# Patient Record
Sex: MALE | Race: WHITE | Employment: OTHER | URBAN - METROPOLITAN AREA
[De-identification: names, ages, dates, MRNs, and addresses within clinical notes are randomized per-mention and may not be internally consistent; named-entity substitution may affect disease eponyms.]

---

## 2018-12-07 ENCOUNTER — APPOINTMENT (OUTPATIENT)
Dept: GENERAL RADIOLOGY | Age: 83
End: 2018-12-07
Payer: MEDICARE

## 2018-12-07 ENCOUNTER — HOSPITAL ENCOUNTER (EMERGENCY)
Age: 83
Discharge: HOME | End: 2018-12-07
Attending: EMERGENCY MEDICINE
Payer: MEDICARE

## 2018-12-07 ENCOUNTER — APPOINTMENT (OUTPATIENT)
Dept: CT IMAGING | Age: 83
End: 2018-12-07
Payer: MEDICARE

## 2018-12-07 VITALS
SYSTOLIC BLOOD PRESSURE: 180 MMHG | WEIGHT: 146.83 LBS | RESPIRATION RATE: 18 BRPM | HEIGHT: 67 IN | BODY MASS INDEX: 23.04 KG/M2 | OXYGEN SATURATION: 95 % | TEMPERATURE: 98.5 F | DIASTOLIC BLOOD PRESSURE: 74 MMHG | HEART RATE: 60 BPM

## 2018-12-07 DIAGNOSIS — R41.82 ALTERED MENTAL STATUS, UNSPECIFIED ALTERED MENTAL STATUS TYPE: Primary | ICD-10-CM

## 2018-12-07 LAB
A/G RATIO: 1.6 (ref 1.1–2.2)
ALBUMIN SERPL-MCNC: 4.6 G/DL (ref 3.4–5)
ALP BLD-CCNC: 106 U/L (ref 40–129)
ALT SERPL-CCNC: 14 U/L (ref 10–40)
ANION GAP SERPL CALCULATED.3IONS-SCNC: 16 MMOL/L (ref 3–16)
ANISOCYTOSIS: ABNORMAL
AST SERPL-CCNC: 21 U/L (ref 15–37)
BASOPHILS ABSOLUTE: 0.1 K/UL (ref 0–0.2)
BASOPHILS RELATIVE PERCENT: 0.8 %
BILIRUB SERPL-MCNC: 1 MG/DL (ref 0–1)
BILIRUBIN URINE: NEGATIVE
BLOOD, URINE: NEGATIVE
BUN BLDV-MCNC: 14 MG/DL (ref 7–20)
CALCIUM SERPL-MCNC: 9.8 MG/DL (ref 8.3–10.6)
CHLORIDE BLD-SCNC: 103 MMOL/L (ref 99–110)
CLARITY: CLEAR
CO2: 22 MMOL/L (ref 21–32)
COLOR: YELLOW
CREAT SERPL-MCNC: 0.8 MG/DL (ref 0.8–1.3)
EOSINOPHILS ABSOLUTE: 0.3 K/UL (ref 0–0.6)
EOSINOPHILS RELATIVE PERCENT: 4.5 %
GFR AFRICAN AMERICAN: >60
GFR NON-AFRICAN AMERICAN: >60
GLOBULIN: 2.8 G/DL
GLUCOSE BLD-MCNC: 115 MG/DL (ref 70–99)
GLUCOSE BLD-MCNC: 122 MG/DL (ref 70–99)
GLUCOSE URINE: NEGATIVE MG/DL
HCT VFR BLD CALC: 43.5 % (ref 40.5–52.5)
HEMOGLOBIN: 14.6 G/DL (ref 13.5–17.5)
KETONES, URINE: NEGATIVE MG/DL
LEUKOCYTE ESTERASE, URINE: NEGATIVE
LYMPHOCYTES ABSOLUTE: 1 K/UL (ref 1–5.1)
LYMPHOCYTES RELATIVE PERCENT: 14.7 %
MCH RBC QN AUTO: 31.1 PG (ref 26–34)
MCHC RBC AUTO-ENTMCNC: 33.6 G/DL (ref 31–36)
MCV RBC AUTO: 92.5 FL (ref 80–100)
MICROSCOPIC EXAMINATION: NORMAL
MONOCYTES ABSOLUTE: 0.6 K/UL (ref 0–1.3)
MONOCYTES RELATIVE PERCENT: 9.5 %
NEUTROPHILS ABSOLUTE: 4.7 K/UL (ref 1.7–7.7)
NEUTROPHILS RELATIVE PERCENT: 70.5 %
NITRITE, URINE: NEGATIVE
PDW BLD-RTO: 15.7 % (ref 12.4–15.4)
PERFORMED ON: ABNORMAL
PH UA: 5
PLATELET # BLD: 96 K/UL (ref 135–450)
PLATELET SLIDE REVIEW: ABNORMAL
PMV BLD AUTO: 8.7 FL (ref 5–10.5)
POTASSIUM SERPL-SCNC: 4 MMOL/L (ref 3.5–5.1)
PROTEIN UA: NEGATIVE MG/DL
RBC # BLD: 4.7 M/UL (ref 4.2–5.9)
SLIDE REVIEW: ABNORMAL
SODIUM BLD-SCNC: 141 MMOL/L (ref 136–145)
SPECIFIC GRAVITY UA: 1.02
TOTAL PROTEIN: 7.4 G/DL (ref 6.4–8.2)
URINE REFLEX TO CULTURE: NORMAL
URINE TYPE: NORMAL
UROBILINOGEN, URINE: 0.2 E.U./DL
WBC # BLD: 6.7 K/UL (ref 4–11)

## 2018-12-07 PROCEDURE — 93005 ELECTROCARDIOGRAM TRACING: CPT | Performed by: EMERGENCY MEDICINE

## 2018-12-07 PROCEDURE — 97161 PT EVAL LOW COMPLEX 20 MIN: CPT

## 2018-12-07 PROCEDURE — 81003 URINALYSIS AUTO W/O SCOPE: CPT

## 2018-12-07 PROCEDURE — 85025 COMPLETE CBC W/AUTO DIFF WBC: CPT

## 2018-12-07 PROCEDURE — G8988 SELF CARE GOAL STATUS: HCPCS

## 2018-12-07 PROCEDURE — 70450 CT HEAD/BRAIN W/O DYE: CPT

## 2018-12-07 PROCEDURE — 97166 OT EVAL MOD COMPLEX 45 MIN: CPT

## 2018-12-07 PROCEDURE — 99285 EMERGENCY DEPT VISIT HI MDM: CPT

## 2018-12-07 PROCEDURE — 71046 X-RAY EXAM CHEST 2 VIEWS: CPT

## 2018-12-07 PROCEDURE — G8987 SELF CARE CURRENT STATUS: HCPCS

## 2018-12-07 PROCEDURE — 97530 THERAPEUTIC ACTIVITIES: CPT

## 2018-12-07 PROCEDURE — G8979 MOBILITY GOAL STATUS: HCPCS

## 2018-12-07 PROCEDURE — G8978 MOBILITY CURRENT STATUS: HCPCS

## 2018-12-07 PROCEDURE — 80053 COMPREHEN METABOLIC PANEL: CPT

## 2018-12-07 RX ORDER — SIMVASTATIN 20 MG
20 TABLET ORAL NIGHTLY
COMMUNITY
Start: 2018-10-02

## 2018-12-07 RX ORDER — GLIPIZIDE 5 MG/1
5 TABLET, FILM COATED, EXTENDED RELEASE ORAL 2 TIMES DAILY WITH MEALS
Refills: 1 | COMMUNITY
Start: 2018-11-26

## 2018-12-07 RX ORDER — ASPIRIN 81 MG/1
81 TABLET ORAL NIGHTLY
COMMUNITY

## 2018-12-07 RX ORDER — LISINOPRIL 5 MG/1
5 TABLET ORAL DAILY
COMMUNITY

## 2018-12-07 RX ORDER — OMEPRAZOLE 20 MG/1
20 CAPSULE, DELAYED RELEASE ORAL DAILY PRN
COMMUNITY
Start: 2016-12-29

## 2018-12-07 RX ORDER — METOPROLOL SUCCINATE 50 MG/1
50 TABLET, EXTENDED RELEASE ORAL NIGHTLY
Status: ON HOLD | COMMUNITY
Start: 2018-09-17 | End: 2019-01-30

## 2018-12-07 ASSESSMENT — ENCOUNTER SYMPTOMS
SHORTNESS OF BREATH: 0
COLOR CHANGE: 0
RHINORRHEA: 0
BACK PAIN: 0
NAUSEA: 0
WHEEZING: 0
VOMITING: 0
PHOTOPHOBIA: 0

## 2018-12-07 NOTE — PROGRESS NOTES
AMS and multiple falls. Prior to admission, pt lived at home in a ranch-style home. He was independent with self-care and Mod I for fxl mobility with 3WW. Pt is currently below this baseline, being most limited by decreased balance and decreased insights/cognition. This date, he required mod verbal cues for safety during mobility, transfers and ADL tasks (donning R LE prosthesis). He required Min A for fxl mobility with use of RW. Pt will benefit from continued therapy while in the hospital in order to maximize function. Recommend continued therapy at d/c. Prognosis: Good  Decision Making: Medium Complexity  History: pt is from home where he lives alone. he was independent with self-care and fxl mobility. now admitted with AMS and multiple falls  Exam: decreased balance, decreased insights, decreased safety awareness, decreased activity tolerance  Assistance / Modification: Min A bed mobility, Min A fxl transfers and mobility with RW, Mod A LE dressing (don prosthesis). PMH includes: R BKA, HTN, DM, PVD, CAD, dementia  Patient Education: Role of OT, POC, transfer training  Barriers to Learning: cognition/emotional  REQUIRES OT FOLLOW UP: Yes  Activity Tolerance  Activity Tolerance: Patient Tolerated treatment well  Safety Devices  Safety Devices in place: Yes  Type of devices: Call light within reach; Left in bed;Gait belt;Patient at risk for falls (pt on stretcher in ED)         Plan   Plan  Times per week: 3-5  Times per day: Daily  Current Treatment Recommendations: Strengthening, Endurance Training, Safety Education & Training, Self-Care / ADL, Equipment Evaluation, Education, & procurement, Patient/Caregiver Education & Training, Cognitive Reorientation    G-Code  OT G-codes  Functional Assessment Tool Used: [facility] ADL   Score: 16  Functional Limitation: Self care  Self Care Current Status (): At least 40 percent but less than 60 percent impaired, limited or restricted  Self Care Goal Status ():  At
Status (): At least 20 percent but less than 40 percent impaired, limited or restricted                 AM-PAC Score  AM-PAC Inpatient Mobility Raw Score : 15  AM-PAC Inpatient T-Scale Score : 39.45  Mobility Inpatient CMS 0-100% Score: 57.7  Mobility Inpatient CMS G-Code Modifier : CK          Goals  Short term goals  Time Frame for Short term goals:  In 2-3 days pt will perform  Short term goal 1: Bed Mobility CGA  Short term goal 2: Transfers CGA  Short term goal 3: Ambulate 48' with LRAD, Min A   Long term goals  Time Frame for Long term goals : LTG = STG  Patient Goals   Patient goals : Unable to state       Therapy Time   Individual Concurrent Group Co-treatment   Time In       1015   Time Out       1515   Minutes       33   Timed Code Treatment Minutes: 120 Karri Hartman, PT    Electronically signed by Camila Hartman, PT [id number] on 12/7/2018 at 3:59 PM

## 2018-12-07 NOTE — ED PROVIDER NOTES
Gastrointestinal: Negative for nausea and vomiting. Endocrine: Negative for polydipsia and polyuria. Genitourinary: Negative for difficulty urinating and frequency. Musculoskeletal: Negative for back pain and gait problem. Skin: Negative for color change and rash. Neurological: Negative for light-headedness and headaches. Psychiatric/Behavioral: Negative for confusion. The patient is not nervous/anxious. Except as noted above the remainder of the review of systems was reviewed and negative. PAST MEDICAL HISTORY     Past Medical History:   Diagnosis Date    [facility]ab[facility]) [facility] [address]     CAD (coronary artery disease)     Cerebral artery occlusion with cerebral infarction ([facility] [address]     DDD (degenerative disc disease), cervical     Dementia     Diabetes mellitus ([facility] [address]     Hyperlipidemia     Hypertension     Pacemaker     PVD (peripheral vascular disease) ([facility] [address]          SURGICALHISTORY       Past Surgical History:   Procedure Laterality Date    CARDIAC SURGERY  May 2007    CABG X 7    LEG AMPUTATION BELOW KNEE      right         CURRENT MEDICATIONS       Previous Medications    No medications on file       ALLERGIES     Clopidogrel; Donepezil; Naproxen; and Metformin    FAMILY HISTORY     History reviewed. No pertinent family history.        SOCIAL HISTORY       Social History     Social History    Marital status:      Spouse name: N/A    Number of children: N/A    Years of education: N/A     Social History Main Topics    Smoking status: None    Smokeless tobacco: None    Alcohol use None    Drug use: Unknown    Sexual activity: Not Asked     Other Topics Concern    None     Social History Narrative    None       SCREENINGS      @FLOW([id number])@      PHYSICAL EXAM    (up to 7 for level 4, 8 or more for level 5)     ED Triage Vitals   BP Temp Temp Source Pulse Resp SpO2 Height Weight   12/07/18 1134 12/07/18 1134 12/07/18 1134 12/07/18 1134 12/07/18 1134 12/07/18 1130 12/07/18 1134 12/07/18 1134   (!) 166/93 97.7 °F (36.5 °C) Oral 76 12 96 % 5' 7\" (1.702 m) 146 lb 13.2 oz (66.6 kg)       Physical Exam   Constitutional: He is oriented to person, place, and time. He appears well-developed and well-nourished. No distress. HENT:   Head: Normocephalic and atraumatic. Eyes: Conjunctivae and EOM are normal.   Neck: Normal range of motion. No tracheal deviation present. Cardiovascular: Normal rate and regular rhythm. Pulmonary/Chest: Effort normal and breath sounds normal. He has no wheezes. He has no rales. Abdominal: Soft. He exhibits no distension. There is no tenderness. There is no CVA tenderness. Musculoskeletal: Normal range of motion. He exhibits no edema or deformity. Neurological: He is alert and oriented to person, place, and time. No cranial nerve deficit or sensory deficit. He exhibits normal muscle tone. Coordination normal.   Skin: Skin is warm and dry.        DIAGNOSTIC RESULTS     EKG: All EKG's are interpreted by the Emergency Department Physician who either signs or Co-signsthis chart in the absence of a cardiologist.        RADIOLOGY:   Meron Shah such as CT, Ultrasound and MRI are read by the radiologist. Plain radiographic images are visualized and preliminarily interpreted by the emergency physician with the below findings:        Interpretation per the Radiologist below, if available at the time ofthis note:    XR CHEST STANDARD (2 VW)    (Results Pending)   CT Head WO Contrast    (Results Pending)         ED BEDSIDE ULTRASOUND:   Performed by ED Physician - none    LABS:  Labs Reviewed   POCT GLUCOSE - Abnormal; Notable for the following:        Result Value    POC Glucose 115 (*)     All other components within normal limits    Narrative:     Performed at:  [address] [address]   Phone [phone]   CBC WITH AUTO DIFFERENTIAL   COMPREHENSIVE METABOLIC

## 2018-12-07 NOTE — CARE COORDINATION
Update patient's friend Dustyfeliciano Josse that I have not heard back from patient's children. She provided another daughter's name and #. Sergio Opitz [phone]. SW called and left message - requesting a call back.

## 2018-12-08 LAB
EKG ATRIAL RATE: 67 BPM
EKG DIAGNOSIS: NORMAL
EKG P AXIS: 49 DEGREES
EKG P-R INTERVAL: 80 MS
EKG Q-T INTERVAL: 454 MS
EKG QRS DURATION: 154 MS
EKG QTC CALCULATION (BAZETT): 479 MS
EKG R AXIS: 98 DEGREES
EKG T AXIS: -80 DEGREES
EKG VENTRICULAR RATE: 67 BPM

## 2018-12-08 PROCEDURE — 93010 ELECTROCARDIOGRAM REPORT: CPT | Performed by: INTERNAL MEDICINE

## 2019-01-30 ENCOUNTER — APPOINTMENT (OUTPATIENT)
Dept: CT IMAGING | Age: 84
DRG: 558 | End: 2019-01-30
Payer: MEDICARE

## 2019-01-30 ENCOUNTER — APPOINTMENT (OUTPATIENT)
Dept: GENERAL RADIOLOGY | Age: 84
DRG: 558 | End: 2019-01-30
Payer: MEDICARE

## 2019-01-30 ENCOUNTER — HOSPITAL ENCOUNTER (INPATIENT)
Age: 84
LOS: 5 days | Discharge: NURSING, REHAB OR LONG-TERM CARE | DRG: 558 | End: 2019-02-04
Attending: EMERGENCY MEDICINE | Admitting: INTERNAL MEDICINE
Payer: MEDICARE

## 2019-01-30 PROBLEM — M62.82 RHABDOMYOLYSIS: Status: ACTIVE | Noted: 2019-01-30

## 2019-01-30 PROBLEM — E78.00 HIGH CHOLESTEROL: Status: ACTIVE | Noted: 2019-01-30

## 2019-01-30 PROBLEM — E11.9 DM2 (DIABETES MELLITUS, TYPE 2) (HCC): Status: ACTIVE | Noted: 2019-01-30

## 2019-01-30 PROBLEM — I10 HTN (HYPERTENSION): Status: ACTIVE | Noted: 2019-01-30

## 2019-01-30 LAB
A/G RATIO: 1.2 (ref 1.1–2.2)
ALBUMIN SERPL-MCNC: 4 G/DL (ref 3.4–5)
ALP BLD-CCNC: 123 U/L (ref 40–129)
ALT SERPL-CCNC: 26 U/L (ref 10–40)
AMPHETAMINE SCREEN, URINE: NORMAL
ANION GAP SERPL CALCULATED.3IONS-SCNC: 15 MMOL/L (ref 3–16)
AST SERPL-CCNC: 56 U/L (ref 15–37)
BARBITURATE SCREEN URINE: NORMAL
BASOPHILS ABSOLUTE: 0 K/UL (ref 0–0.2)
BASOPHILS RELATIVE PERCENT: 0.3 %
BENZODIAZEPINE SCREEN, URINE: NORMAL
BILIRUB SERPL-MCNC: 1.3 MG/DL (ref 0–1)
BILIRUBIN URINE: ABNORMAL
BLOOD, URINE: NEGATIVE
BUN BLDV-MCNC: 21 MG/DL (ref 7–20)
CALCIUM SERPL-MCNC: 9.6 MG/DL (ref 8.3–10.6)
CANNABINOID SCREEN URINE: NORMAL
CHLORIDE BLD-SCNC: 98 MMOL/L (ref 99–110)
CLARITY: ABNORMAL
CO2: 23 MMOL/L (ref 21–32)
COCAINE METABOLITE SCREEN URINE: NORMAL
COLOR: ABNORMAL
CREAT SERPL-MCNC: 0.8 MG/DL (ref 0.8–1.3)
EKG ATRIAL RATE: 75 BPM
EKG DIAGNOSIS: NORMAL
EKG P AXIS: 45 DEGREES
EKG P-R INTERVAL: 152 MS
EKG Q-T INTERVAL: 450 MS
EKG QRS DURATION: 130 MS
EKG QTC CALCULATION (BAZETT): 502 MS
EKG R AXIS: 120 DEGREES
EKG T AXIS: -20 DEGREES
EKG VENTRICULAR RATE: 75 BPM
EOSINOPHILS ABSOLUTE: 0 K/UL (ref 0–0.6)
EOSINOPHILS RELATIVE PERCENT: 0 %
EPITHELIAL CELLS, UA: 0 /HPF (ref 0–5)
ETHANOL: NORMAL MG/DL (ref 0–0.08)
GFR AFRICAN AMERICAN: >60
GFR NON-AFRICAN AMERICAN: >60
GLOBULIN: 3.4 G/DL
GLUCOSE BLD-MCNC: 111 MG/DL (ref 70–99)
GLUCOSE BLD-MCNC: 144 MG/DL (ref 70–99)
GLUCOSE URINE: NEGATIVE MG/DL
HCT VFR BLD CALC: 42.4 % (ref 40.5–52.5)
HEMOGLOBIN: 14.6 G/DL (ref 13.5–17.5)
HYALINE CASTS: 1 /LPF (ref 0–8)
INR BLD: 1.17 (ref 0.86–1.14)
KETONES, URINE: 40 MG/DL
LACTIC ACID: 1.5 MMOL/L (ref 0.4–2)
LEUKOCYTE ESTERASE, URINE: NEGATIVE
LYMPHOCYTES ABSOLUTE: 0.7 K/UL (ref 1–5.1)
LYMPHOCYTES RELATIVE PERCENT: 8 %
Lab: NORMAL
MCH RBC QN AUTO: 30.9 PG (ref 26–34)
MCHC RBC AUTO-ENTMCNC: 34.4 G/DL (ref 31–36)
MCV RBC AUTO: 89.8 FL (ref 80–100)
METHADONE SCREEN, URINE: NORMAL
MICROSCOPIC EXAMINATION: YES
MONOCYTES ABSOLUTE: 1 K/UL (ref 0–1.3)
MONOCYTES RELATIVE PERCENT: 10.6 %
NEUTROPHILS ABSOLUTE: 7.3 K/UL (ref 1.7–7.7)
NEUTROPHILS RELATIVE PERCENT: 81.1 %
NITRITE, URINE: NEGATIVE
OPIATE SCREEN URINE: NORMAL
OXYCODONE URINE: NORMAL
PDW BLD-RTO: 14.1 % (ref 12.4–15.4)
PERFORMED ON: ABNORMAL
PH UA: 5.5
PH UA: 5.5
PHENCYCLIDINE SCREEN URINE: NORMAL
PLATELET # BLD: 129 K/UL (ref 135–450)
PMV BLD AUTO: 8 FL (ref 5–10.5)
POTASSIUM REFLEX MAGNESIUM: 3.8 MMOL/L (ref 3.5–5.1)
PRO-BNP: 3360 PG/ML (ref 0–449)
PROPOXYPHENE SCREEN: NORMAL
PROTEIN UA: ABNORMAL MG/DL
PROTHROMBIN TIME: 13.3 SEC (ref 9.8–13)
RBC # BLD: 4.72 M/UL (ref 4.2–5.9)
RBC UA: 4 /HPF (ref 0–4)
SODIUM BLD-SCNC: 136 MMOL/L (ref 136–145)
SPECIFIC GRAVITY UA: 1.02
TOTAL CK: 983 U/L (ref 39–308)
TOTAL PROTEIN: 7.4 G/DL (ref 6.4–8.2)
TROPONIN: <0.01 NG/ML
URINE REFLEX TO CULTURE: ABNORMAL
URINE TYPE: ABNORMAL
UROBILINOGEN, URINE: 1 E.U./DL
WBC # BLD: 9 K/UL (ref 4–11)
WBC UA: 4 /HPF (ref 0–5)

## 2019-01-30 PROCEDURE — 85610 PROTHROMBIN TIME: CPT

## 2019-01-30 PROCEDURE — 84300 ASSAY OF URINE SODIUM: CPT

## 2019-01-30 PROCEDURE — 83880 ASSAY OF NATRIURETIC PEPTIDE: CPT

## 2019-01-30 PROCEDURE — 96366 THER/PROPH/DIAG IV INF ADDON: CPT

## 2019-01-30 PROCEDURE — 2580000003 HC RX 258: Performed by: INTERNAL MEDICINE

## 2019-01-30 PROCEDURE — 6370000000 HC RX 637 (ALT 250 FOR IP): Performed by: INTERNAL MEDICINE

## 2019-01-30 PROCEDURE — 85025 COMPLETE CBC W/AUTO DIFF WBC: CPT

## 2019-01-30 PROCEDURE — 84133 ASSAY OF URINE POTASSIUM: CPT

## 2019-01-30 PROCEDURE — 96361 HYDRATE IV INFUSION ADD-ON: CPT

## 2019-01-30 PROCEDURE — 93005 ELECTROCARDIOGRAM TRACING: CPT | Performed by: EMERGENCY MEDICINE

## 2019-01-30 PROCEDURE — 93010 ELECTROCARDIOGRAM REPORT: CPT | Performed by: INTERNAL MEDICINE

## 2019-01-30 PROCEDURE — 83605 ASSAY OF LACTIC ACID: CPT

## 2019-01-30 PROCEDURE — 2580000003 HC RX 258: Performed by: EMERGENCY MEDICINE

## 2019-01-30 PROCEDURE — 6360000002 HC RX W HCPCS: Performed by: INTERNAL MEDICINE

## 2019-01-30 PROCEDURE — 99285 EMERGENCY DEPT VISIT HI MDM: CPT

## 2019-01-30 PROCEDURE — G0480 DRUG TEST DEF 1-7 CLASSES: HCPCS

## 2019-01-30 PROCEDURE — 83935 ASSAY OF URINE OSMOLALITY: CPT

## 2019-01-30 PROCEDURE — 82570 ASSAY OF URINE CREATININE: CPT

## 2019-01-30 PROCEDURE — 80307 DRUG TEST PRSMV CHEM ANLYZR: CPT

## 2019-01-30 PROCEDURE — 96372 THER/PROPH/DIAG INJ SC/IM: CPT

## 2019-01-30 PROCEDURE — 81001 URINALYSIS AUTO W/SCOPE: CPT

## 2019-01-30 PROCEDURE — 1200000000 HC SEMI PRIVATE

## 2019-01-30 PROCEDURE — 80053 COMPREHEN METABOLIC PANEL: CPT

## 2019-01-30 PROCEDURE — 70450 CT HEAD/BRAIN W/O DYE: CPT

## 2019-01-30 PROCEDURE — 71045 X-RAY EXAM CHEST 1 VIEW: CPT

## 2019-01-30 PROCEDURE — 2500000003 HC RX 250 WO HCPCS: Performed by: INTERNAL MEDICINE

## 2019-01-30 PROCEDURE — 94760 N-INVAS EAR/PLS OXIMETRY 1: CPT

## 2019-01-30 PROCEDURE — 84484 ASSAY OF TROPONIN QUANT: CPT

## 2019-01-30 PROCEDURE — 82550 ASSAY OF CK (CPK): CPT

## 2019-01-30 PROCEDURE — 96365 THER/PROPH/DIAG IV INF INIT: CPT

## 2019-01-30 PROCEDURE — 87040 BLOOD CULTURE FOR BACTERIA: CPT

## 2019-01-30 PROCEDURE — 82436 ASSAY OF URINE CHLORIDE: CPT

## 2019-01-30 RX ORDER — SODIUM CHLORIDE 0.9 % (FLUSH) 0.9 %
10 SYRINGE (ML) INJECTION EVERY 12 HOURS SCHEDULED
Status: DISCONTINUED | OUTPATIENT
Start: 2019-01-30 | End: 2019-02-04 | Stop reason: HOSPADM

## 2019-01-30 RX ORDER — NICOTINE POLACRILEX 4 MG
15 LOZENGE BUCCAL PRN
Status: DISCONTINUED | OUTPATIENT
Start: 2019-01-30 | End: 2019-02-04 | Stop reason: HOSPADM

## 2019-01-30 RX ORDER — POTASSIUM CHLORIDE 7.45 MG/ML
10 INJECTION INTRAVENOUS PRN
Status: DISCONTINUED | OUTPATIENT
Start: 2019-01-30 | End: 2019-02-04 | Stop reason: HOSPADM

## 2019-01-30 RX ORDER — METOPROLOL SUCCINATE 50 MG/1
50 TABLET, EXTENDED RELEASE ORAL NIGHTLY
Status: DISCONTINUED | OUTPATIENT
Start: 2019-01-30 | End: 2019-02-04 | Stop reason: HOSPADM

## 2019-01-30 RX ORDER — OMEPRAZOLE 20 MG/1
20 CAPSULE, DELAYED RELEASE ORAL EVERY MORNING
Status: DISCONTINUED | OUTPATIENT
Start: 2019-01-31 | End: 2019-01-30 | Stop reason: CLARIF

## 2019-01-30 RX ORDER — DEXTROSE MONOHYDRATE 50 MG/ML
100 INJECTION, SOLUTION INTRAVENOUS PRN
Status: DISCONTINUED | OUTPATIENT
Start: 2019-01-30 | End: 2019-02-04 | Stop reason: HOSPADM

## 2019-01-30 RX ORDER — SIMVASTATIN 20 MG
20 TABLET ORAL NIGHTLY
Status: DISCONTINUED | OUTPATIENT
Start: 2019-01-30 | End: 2019-01-31

## 2019-01-30 RX ORDER — HEPARIN SODIUM 5000 [USP'U]/ML
5000 INJECTION, SOLUTION INTRAVENOUS; SUBCUTANEOUS EVERY 8 HOURS SCHEDULED
Status: DISCONTINUED | OUTPATIENT
Start: 2019-01-30 | End: 2019-02-04 | Stop reason: HOSPADM

## 2019-01-30 RX ORDER — SODIUM CHLORIDE 0.9 % (FLUSH) 0.9 %
10 SYRINGE (ML) INJECTION PRN
Status: DISCONTINUED | OUTPATIENT
Start: 2019-01-30 | End: 2019-02-04 | Stop reason: HOSPADM

## 2019-01-30 RX ORDER — 0.9 % SODIUM CHLORIDE 0.9 %
1000 INTRAVENOUS SOLUTION INTRAVENOUS ONCE
Status: COMPLETED | OUTPATIENT
Start: 2019-01-30 | End: 2019-01-30

## 2019-01-30 RX ORDER — ONDANSETRON 2 MG/ML
4 INJECTION INTRAMUSCULAR; INTRAVENOUS EVERY 6 HOURS PRN
Status: DISCONTINUED | OUTPATIENT
Start: 2019-01-30 | End: 2019-02-04 | Stop reason: HOSPADM

## 2019-01-30 RX ORDER — GLIPIZIDE 5 MG/1
5 TABLET, FILM COATED, EXTENDED RELEASE ORAL 2 TIMES DAILY WITH MEALS
Status: DISCONTINUED | OUTPATIENT
Start: 2019-01-30 | End: 2019-01-30 | Stop reason: CLARIF

## 2019-01-30 RX ORDER — LISINOPRIL 5 MG/1
5 TABLET ORAL DAILY
Status: DISCONTINUED | OUTPATIENT
Start: 2019-01-30 | End: 2019-02-04 | Stop reason: HOSPADM

## 2019-01-30 RX ORDER — DOCUSATE SODIUM 100 MG/1
100 CAPSULE, LIQUID FILLED ORAL 2 TIMES DAILY
Status: DISCONTINUED | OUTPATIENT
Start: 2019-01-30 | End: 2019-02-04 | Stop reason: HOSPADM

## 2019-01-30 RX ORDER — GLIPIZIDE 5 MG/1
5 TABLET ORAL
Status: DISCONTINUED | OUTPATIENT
Start: 2019-01-30 | End: 2019-01-31

## 2019-01-30 RX ORDER — ASPIRIN 81 MG/1
81 TABLET ORAL NIGHTLY
Status: DISCONTINUED | OUTPATIENT
Start: 2019-01-30 | End: 2019-02-04 | Stop reason: HOSPADM

## 2019-01-30 RX ORDER — DEXTROSE MONOHYDRATE 25 G/50ML
12.5 INJECTION, SOLUTION INTRAVENOUS PRN
Status: DISCONTINUED | OUTPATIENT
Start: 2019-01-30 | End: 2019-02-04 | Stop reason: HOSPADM

## 2019-01-30 RX ORDER — POTASSIUM CHLORIDE 20 MEQ/1
40 TABLET, EXTENDED RELEASE ORAL PRN
Status: DISCONTINUED | OUTPATIENT
Start: 2019-01-30 | End: 2019-02-04 | Stop reason: HOSPADM

## 2019-01-30 RX ORDER — PANTOPRAZOLE SODIUM 40 MG/1
40 TABLET, DELAYED RELEASE ORAL
Status: DISCONTINUED | OUTPATIENT
Start: 2019-01-31 | End: 2019-02-04 | Stop reason: HOSPADM

## 2019-01-30 RX ADMIN — SODIUM CHLORIDE 1000 ML: 9 INJECTION, SOLUTION INTRAVENOUS at 17:08

## 2019-01-30 RX ADMIN — HEPARIN SODIUM 5000 UNITS: 5000 INJECTION INTRAVENOUS; SUBCUTANEOUS at 20:42

## 2019-01-30 RX ADMIN — ASPIRIN 81 MG: 81 TABLET, COATED ORAL at 20:37

## 2019-01-30 RX ADMIN — Medication: at 20:38

## 2019-01-30 RX ADMIN — SIMVASTATIN 20 MG: 20 TABLET, FILM COATED ORAL at 20:37

## 2019-01-30 RX ADMIN — METOPROLOL SUCCINATE 50 MG: 50 TABLET, FILM COATED, EXTENDED RELEASE ORAL at 20:37

## 2019-01-30 RX ADMIN — Medication 10 ML: at 20:37

## 2019-01-30 ASSESSMENT — PAIN SCALES - GENERAL
PAINLEVEL_OUTOF10: 0
PAINLEVEL_OUTOF10: 0

## 2019-01-30 NOTE — CARE COORDINATION
Discharge Planning:  ORTEGA met with pt's son who reported pt lives alone, has early dementia and has fallen 4x in the last 2 weeks. Son reports pt should not be living alone and needs placement. Son stated pt was at [facility] last December, pt got better and become more alert and oriented and insisted on leaving. Pt was discharged home and son states it has been worse since. Pt was found today on the floor, unknown how long, with streaks of feces on the floor from the couch to the bathroom. Pt has a prosthetic leg and son reports when pt falls he is unable to pull himself back up by his arms. Son reports pt has [employer] for home meals and cleaning aide, also has 1 nurse coming to check on him once a week (unsure of agency). Son reported pt cannot manage his meds, is confused most of the time and unsafe to go home. Son stated he is [facility] and has copies of the forms. ORTEGA spoke with Dr. Shelva Leyden who stated he was aware of the situation and doesn't plan to send pt home. ORTEGA called [facility] and spoke with Ilen[facility], [phone], who stated yes they have beds right now. ORTEGA saw pt was admitted and spoke with son again informing pt will be admitted and [facility] has beds. Son stated [facility] was 2nd choice, but SW stated unsure if they take [facility]. ORTEGA stated I would fax facesheet to [facility] so they can follow pt and SW upstairs would assist with discharge to facility. ORTEGA faxed facesheet to [facility].       Electronically signed by KATHERIN Bryant, MYRA on 1/30/2019 at 5:36 PM

## 2019-01-30 NOTE — H&P
History and Physical  Dr. Milad Schaefer  1/30/2019    PCP: Gretel Jackson    Cc:   Chief Complaint   Patient presents with    Fall     Pt. comes in today by [facility] from home with a fall. Pt. was found in front of his recliner. EMS reports that there was a line of stool in front of his chair from where the patient had been crawling. Pt. has history of dementia. HPI:  Gabriella Shepherd is a 82 y.o. male who has a past medical history of AAA (abdominal aortic aneurysm) ([facility] [address] CAD (coronary artery disease); Cerebral artery occlusion with cerebral infarction [facility]); DDD (degenerative disc disease), cervical; Dementia; Diabetes mellitus ([facility] [address] Hyperlipidemia; Hypertension; Pacemaker; and PVD (peripheral vascular disease) ([facility] [address] Patient presents with Rhabdomyolysis. HPI   82 y.o. male  who presents to the ED complaining of Limited history from the patient directly given his dementia. He thinks it is 82, is unsure where he is or why, and says that he has no complaints although is able to identify himself. He has had no fevers. He denies any falls in over a month however for collateral history he probably has been having increased frequency of falls and was found in front of a lazy boy chair with a line of stool from the chair to the patient had been crawling previously. Patient does not recall this. Therefore history is unable to be further clarified from the patient himself regarding any kind of specific mechanism of fall, syncope or otherwise. Cannot get much other hx from patient. He gets somewhat belligerent on continued questioning    Problem list of hospitalization thus far:   Active Hospital Problems    Diagnosis    Rhabdomyolysis [M62.82]    HTN (hypertension) [I10]    High cholesterol [E78.00]    DM2 (diabetes mellitus, type 2) ([facility] [address] [E11.9]         Review of Systems: (1 system for EPF, 2-9 for detailed, 10+ for comprehensive)  Review of Systems   Unable to perform ROS: Dementia    (he just answers no to everything - I do not think this is a true ROS)       Past Medical History:   Past Medical History:   Diagnosis Date    AAA (abdominal aortic aneurysm) ([facility] [address]     CAD (coronary artery disease)     Cerebral artery occlusion with cerebral infarction ([facility] [address]     DDD (degenerative disc disease), cervical     Dementia     Diabetes mellitus ([facility] [address]     Hyperlipidemia     Hypertension     Pacemaker     PVD (peripheral vascular disease) ([facility] [address]        Past Surgical History:   Past Surgical History:   Procedure Laterality Date    CARDIAC SURGERY  May 2007    CABG X 7    LEG AMPUTATION BELOW KNEE      right       Social History:   Social History     Tobacco History     Smoking Status  Former Smoker Smoking Tobacco Type  Cigarettes    Smokeless Tobacco Use  Never Used          Alcohol History     Alcohol Use Status  No          Drug Use     Drug Use Status  No          Sexual Activity     Sexually Active  Not Asked                Fam History: History reviewed. No pertinent family history. PFSH: The above PMHx, PSHx, SocHx, FamHx has been reviewed by myself. (1 area for detailed, 2-3 for comprehensive)      Code Status: No Order    Meds - following list of home medications fromelectronic chart has been reviewed by myself  Prior to Admission medications    Medication Sig Start Date End Date Taking?  Authorizing Provider   aspirin 81 MG EC tablet Take 81 mg by mouth nightly     Historical Provider, MD   lisinopril (PRINIVIL;ZESTRIL) 5 MG tablet Take 5 mg by mouth daily     Historical Provider, MD   simvastatin (ZOCOR) 20 MG tablet Take 20 mg by mouth nightly 10/2/18   Historical Provider, MD   omeprazole (PRILOSEC) 20 MG delayed release capsule Take 20 mg by mouth daily as needed for For acid hypersecretion 12/29/16   Historical Provider, MD   glipiZIDE (GLUCOTROL XL) 5 MG extended release tablet Take 5 mg by mouth 2 times daily (with meals) 11/26/18   Historical Provider, MD           Allergies   Allergen Reactions    Clopidogrel Other (See Comments)    Donepezil Other (See Comments)    Naproxen Itching    Metformin Diarrhea             EXAM: (2-7 system for EPF/Detailed, ?8 for Comprehensive)  /65   Pulse 79   Temp 97.5 °F (36.4 °C) (Oral)   Resp 18   Ht 5' 7\" (1.702 m)   Wt 150 lb (68 kg)   SpO2 95%   BMI 23.49 kg/m²   Constitutional: vitals as above: alert, appears stated age and combative  Psychiatric: , oriented to person and year  Head: Normocephalic, without obvious abnormality, atraumatic  Eyes:lids and lashes normal, conjunctivae and sclerae normal and pupils equal, round, reactive to light and accomodation  EMNT: external ears normal, lips normal  Neck: no adenopathy, supple, symmetrical, trachea midline and thyroid not enlarged, symmetric, no tenderness/mass/nodules   Respiratory: clear to auscultation without wheezes or rales  Cardiovascular: normal rate, regular rhythm, normal S1 and S2 and no gallops  Gastrointestinal: soft, non-tender, non-distended, normal bowel sounds, no masses or organomegaly  Lymphatic:   Extremities: no edema. No clubbing  Skin:No rashes or nodules noted.   Neurologic:    LABS:  Labs Reviewed   CBC WITH AUTO DIFFERENTIAL - Abnormal; Notable for the following:        Result Value    Platelets 269 (*)     Lymphocytes # 0.7 (*)     All other components within normal limits    Narrative:     Performed at:  [facility]  [address] [facility] [employer]   Phone [phone]   COMPREHENSIVE METABOLIC PANEL W/ REFLEX TO MG FOR LOW K - Abnormal; Notable for the following:     Chloride 98 (*)     Glucose 144 (*)     BUN 21 (*)     Total Bilirubin 1.3 (*)     AST 56 (*)     All other components within normal limits    Narrative:     Performed at:  [facility]  [address] [facility], [address] [employer]   Phone [phone]   PROTIME-INR - Abnormal; Notable for the following:     Protime 13.3 (*)     INR 1.17 (*)     All other components within normal limits    Narrative:     Performed at:  [facility] [address],  [facility], [facility] [place]   Phone [phone]   URINE RT REFLEX TO CULTURE - Abnormal; Notable for the following:     Clarity, UA CLOUDY (*)     Bilirubin Urine SMALL (*)     Ketones, Urine 40 (*)     Protein, UA TRACE (*)     All other components within normal limits    Narrative:     Performed at:  [facility] [address],  [facility], [facility] [place]   Phone 21  - Abnormal; Notable for the following:     Pro-BNP 3,360 (*)     All other components within normal limits    Narrative:     Performed at:  [facility] [address],  [facility], [facility] [place]   Phone [phone]   CK - Abnormal; Notable for the following:      Total  (*)     All other components within normal limits    Narrative:     Performed at:  [facility] [address],  [facility], [facility] [place]   Phone [phone]   CULTURE BLOOD #1   CULTURE BLOOD #2   LACTIC ACID, PLASMA    Narrative:     Performed at:  [facility] [address],  [facility], [facility] [place]   Phone [phone]   TROPONIN    Narrative:     Performed at:  [facility] [address],  [facility], [facility] [place]   Phone [phone]   URINE DRUG SCREEN    Narrative:     Performed at:  [facility] [address],  [facility], [facility] [place]   Phone [phone]   ETHANOL    Narrative:     Performed at:  [facility] [address], [facility], [facility] [place]   Phone [phone]   MICROSCOPIC URINALYSIS    Narrative:     Performed at:  [facility] [address],  [facility], [facility] [place]   Phone [phone]         IMAGING:  Imaging results from the ER have of normal.  Left-sided cardiac device. Lungs appear clear. No acute bony abnormality. No acute findings. EKG: from ER, interpreted by self. SR at 75. No acute ST elevation noted. No TWI. LBBB present. Comparison is made to old EKG in chart dated 12/7/2018 which is morphologically similar. MEDICAL DECISION MAKING:    Patient Active Hospital Problem List:   Rhabdomyolysis (1/30/2019)    Assessment: New Problem to me. Pt with elevated CPK. Pt with hx of fall, unknwn how long he was on ground    Plan: admit, iv bicarb drip started. Serial  CPK to be followed. Will trend Creat to look for renal dysfxn   HTN (hypertension) (1/30/2019)    Assessment: Established problem. Stable. 138/65    Plan: Pt home BP meds reviewed and will be continued. Will monitor labs to assess Creat/K for possible complications of medications. High cholesterol (1/30/2019)    Assessment: Established problem. Stable. Plan: on statin, to continue   DM2 (diabetes mellitus, type 2) ([facility] [address] (1/30/2019)    Assessment: Established problem. Stable. FSBS 111 in ER    Plan: Patient placed on controlled carbohydrate diet. Fingerstick sugars to be checked to monitor for both hypoglycemia as well as hyperglycemia. Sliding scale insulin ordered. Glucagon and dextrose ordered for hypoglycemia. Patient will be continued on home medications. Hemoglobin a1c to be ordered to assess efficacy of therapy. Diagnoses as listed above, designated as new or established and plan outlined for each. Data Reviewed:   (1) Lab tests were reviewed or ordered. (1) Radiology tests were reviewed or ordered. (1) Medical test (Echo, EKG, PFT/eleazar) were not ordered. (1)History was obtained from someone other than patient  (1) Old records  were reviewed - see HPI/MDM for pertinent details if review done. (2) Case wasdiscussed with another health care provider: Dr Stu Rueda  (2) Imaging was viewed by myself.  cxr 1/30  (2) EKG  was viewed by myself. The patient isbeing placed in inpatient status with the expectation of requiring a hospital stay spanning at least two midnights for care and treatment of the problems noted in the problem list.  This determination is also based on thepatients comorbidities and past medical history, the severity and timing of the signs and symptoms upon presentation.         Electronically signed by: Brant Simeon 1/30/2019

## 2019-01-30 NOTE — ED PROVIDER NOTES
[facility] Emergency Department    CHIEF COMPLAINT  Chief Complaint   Patient presents with    Fall     Pt. comes in today by [facility] from home with a fall. Pt. was found in front of his recliner. EMS reports that there was a line of stool in front of his chair from where the patient had been crawling. Pt. has history of dementia. HISTORY OF PRESENT ILLNESS  Rudy Paz is a 82 y.o. male  who presents to the ED complaining of Limited history from the patient directly given his dementia. He thinks it is 82, is unsure where he is or why, and says that he has no complaints although is able to identify himself. He has had no fevers. He denies any falls in over a month however for collateral history he probably has been having increased frequency of falls and was found in front of a lazy boy chair with a line of stool from the chair to the patient had been crawling previously. Patient does not recall this. Therefore history is unable to be further clarified from the patient himself regarding any kind of specific mechanism of fall, syncope or otherwise. No other complaints, modifying factors or associated symptoms. I have reviewed the following from the nursing documentation. Past Medical History:   Diagnosis Date    AAA (abdominal aortic aneurysm) ([address]     CAD (coronary artery disease)     Cerebral artery occlusion with cerebral infarction ([address]     DDD (degenerative disc disease), cervical     Dementia     Diabetes mellitus ([address]     Hyperlipidemia     Hypertension     Pacemaker     PVD (peripheral vascular disease) ([address]      Past Surgical History:   Procedure Laterality Date    CARDIAC SURGERY  May 2007    CABG X 7    LEG AMPUTATION BELOW KNEE      right     History reviewed. No pertinent family history.   Social History     Social History    Marital status:      Spouse name: N/A    Number of children: N/A    Years of education: N/A - 99 mg/dL    BUN 21 (H) 7 - 20 mg/dL    CREATININE 0.8 0.8 - 1.3 mg/dL    GFR Non-African American >60 >60    GFR African American >60 >60    Calcium 9.6 8.3 - 10.6 mg/dL    Total Protein 7.4 6.4 - 8.2 g/dL    Alb 4.0 3.4 - 5.0 g/dL    Albumin/Globulin Ratio 1.2 1.1 - 2.2    Total Bilirubin 1.3 (H) 0.0 - 1.0 mg/dL    Alkaline Phosphatase 123 40 - 129 U/L    ALT 26 10 - 40 U/L    AST 56 (H) 15 - 37 U/L    Globulin 3.4 g/dL   Protime-INR   Result Value Ref Range    Protime 13.3 (H) 9.8 - 13.0 sec    INR 1.17 (H) 0.86 - 1.14   Lactic Acid, Plasma   Result Value Ref Range    Lactic Acid 1.5 0.4 - 2.0 mmol/L   Urinalysis Reflex to Culture   Result Value Ref Range    Color, UA DK YELLOW Straw/Yellow    Clarity, UA CLOUDY (A) Clear    Glucose, Ur Negative Negative mg/dL    Bilirubin Urine SMALL (A) Negative    Ketones, Urine 40 (A) Negative mg/dL    Specific Gravity, UA 1.022 1.005 - 1.030    Blood, Urine Negative Negative    pH, UA 5.5 5.0 - 8.0    Protein, UA TRACE (A) Negative mg/dL    Urobilinogen, Urine 1.0 <2.0 E.U./dL    Nitrite, Urine Negative Negative    Leukocyte Esterase, Urine Negative Negative    Microscopic Examination YES     Urine Reflex to Culture Not Indicated     Urine Type Not Specified    Troponin   Result Value Ref Range    Troponin <0.01 <0.01 ng/mL   Brain Natriuretic Peptide   Result Value Ref Range    Pro-BNP 3,360 (H) 0 - 449 pg/mL   Drug screen multi urine   Result Value Ref Range    pH, UA 5.5     Drug Screen Comment: see below    Ethanol   Result Value Ref Range    Ethanol Lvl None Detected mg/dL   CK   Result Value Ref Range    Total  (H) 39 - 308 U/L   Microscopic Urinalysis   Result Value Ref Range    Hyaline Casts, UA 1 0 - 8 /LPF    WBC, UA 4 0 - 5 /HPF    RBC, UA 4 0 - 4 /HPF    Epi Cells 0 0 - 5 /HPF   EKG 12 Lead   Result Value Ref Range    Ventricular Rate 75 BPM    Atrial Rate 75 BPM    P-R Interval 152 ms    QRS Duration 130 ms    Q-T Interval 450 ms    QTc Calculation SINGLE XRAY VIEW OF THE CHEST 1/30/2019 2:00 pm COMPARISON: December 7, 2018 HISTORY: ORDERING SYSTEM PROVIDED HISTORY: AMS TECHNOLOGIST PROVIDED HISTORY: Reason for exam:->AMS Ordering Physician Provided Reason for Exam: Fall (Pt. comes in today by [facility] from home with a fall. Pt. was found in front of his recliner. EMS reports that there was a line of stool in front of his chair from where the patient had been crawling. Pt. has history of dementia.) Acuity: Acute Type of Exam: Initial FINDINGS: Cardiac silhouette is upper limits of normal.  Left-sided cardiac device. Lungs appear clear. No acute bony abnormality. No acute findings. ED COURSE/MDM  Patient seen and evaluated. Old records reviewed. Labs and imaging reviewed and results discussed with patient. After initial evaluation, differential diagnostic considerations included: stroke, TIA, intracranial bleed, trauma, infection/sepsis, medication side effect, intoxication/withdrawal, metabolic/electrolyte abnormalities    The patient's ED workup was notable for frequent falls with elevation of CK worrisome for rhabdomyolysis and some ketonuria. IV fluids ordered. Workup is otherwise unremarkable for any other kind of metabolic derangement or infectious process. Head CT is negative and chest x-ray is clear. No other traumatic findings were identified on physical exam warranting imaging. During the patient's ED course, the patient was given:  Medications   0.9 % sodium chloride IV bolus 1,000 mL (not administered)        CLINICAL IMPRESSION  1. Frequent falls    2. Dementia without behavioral disturbance, unspecified dementia type    3. Elevated CK    4. Ketonuria        Blood pressure 138/65, pulse 79, temperature 97.5 °F (36.4 °C), temperature source Oral, resp. rate 18, height 5' 7\" (1.702 m), weight 150 lb (68 kg), SpO2 95 %. DISPOSITION  Snehal Gallardo was admitted in fair condition.     I have discussed the findings of today's

## 2019-01-30 NOTE — ED NOTES
Pt. Cleaned of dried stool, urine sample obtained, pt. Placed on chucks, area of breakdown started on right buttock and redness over coccyx, blanchable. VSS.      Pawan Cr RN  01/30/19 0521

## 2019-01-31 LAB
ANION GAP SERPL CALCULATED.3IONS-SCNC: 10 MMOL/L (ref 3–16)
BASOPHILS ABSOLUTE: 0 K/UL (ref 0–0.2)
BASOPHILS RELATIVE PERCENT: 0.4 %
BUN BLDV-MCNC: 16 MG/DL (ref 7–20)
CALCIUM SERPL-MCNC: 8.6 MG/DL (ref 8.3–10.6)
CHLORIDE BLD-SCNC: 101 MMOL/L (ref 99–110)
CHLORIDE URINE RANDOM: 59 MMOL/L
CO2: 28 MMOL/L (ref 21–32)
CREAT SERPL-MCNC: 0.7 MG/DL (ref 0.8–1.3)
CREATININE URINE: 157.6 MG/DL (ref 39–259)
EOSINOPHILS ABSOLUTE: 0.1 K/UL (ref 0–0.6)
EOSINOPHILS RELATIVE PERCENT: 1.3 %
GFR AFRICAN AMERICAN: >60
GFR NON-AFRICAN AMERICAN: >60
GLUCOSE BLD-MCNC: 121 MG/DL (ref 70–99)
GLUCOSE BLD-MCNC: 123 MG/DL (ref 70–99)
GLUCOSE BLD-MCNC: 125 MG/DL (ref 70–99)
GLUCOSE BLD-MCNC: 74 MG/DL (ref 70–99)
GLUCOSE BLD-MCNC: 90 MG/DL (ref 70–99)
HCT VFR BLD CALC: 36.6 % (ref 40.5–52.5)
HEMOGLOBIN: 12.8 G/DL (ref 13.5–17.5)
LYMPHOCYTES ABSOLUTE: 1.1 K/UL (ref 1–5.1)
LYMPHOCYTES RELATIVE PERCENT: 16.6 %
MCH RBC QN AUTO: 31 PG (ref 26–34)
MCHC RBC AUTO-ENTMCNC: 34.9 G/DL (ref 31–36)
MCV RBC AUTO: 89 FL (ref 80–100)
MONOCYTES ABSOLUTE: 1.2 K/UL (ref 0–1.3)
MONOCYTES RELATIVE PERCENT: 17.5 %
NEUTROPHILS ABSOLUTE: 4.4 K/UL (ref 1.7–7.7)
NEUTROPHILS RELATIVE PERCENT: 64.2 %
OSMOLALITY URINE: 708 MOSM/KG (ref 390–1070)
PDW BLD-RTO: 14.2 % (ref 12.4–15.4)
PERFORMED ON: ABNORMAL
PERFORMED ON: ABNORMAL
PERFORMED ON: NORMAL
PERFORMED ON: NORMAL
PLATELET # BLD: 112 K/UL (ref 135–450)
PMV BLD AUTO: 8 FL (ref 5–10.5)
POTASSIUM SERPL-SCNC: 3.3 MMOL/L (ref 3.5–5.1)
POTASSIUM, UR: 46.7 MMOL/L
RBC # BLD: 4.11 M/UL (ref 4.2–5.9)
SODIUM BLD-SCNC: 139 MMOL/L (ref 136–145)
SODIUM URINE: 64 MMOL/L
TOTAL CK: 390 U/L (ref 39–308)
WBC # BLD: 6.9 K/UL (ref 4–11)

## 2019-01-31 PROCEDURE — 2500000003 HC RX 250 WO HCPCS: Performed by: INTERNAL MEDICINE

## 2019-01-31 PROCEDURE — 36415 COLL VENOUS BLD VENIPUNCTURE: CPT

## 2019-01-31 PROCEDURE — 97535 SELF CARE MNGMENT TRAINING: CPT

## 2019-01-31 PROCEDURE — 6370000000 HC RX 637 (ALT 250 FOR IP): Performed by: INTERNAL MEDICINE

## 2019-01-31 PROCEDURE — 97162 PT EVAL MOD COMPLEX 30 MIN: CPT

## 2019-01-31 PROCEDURE — 97166 OT EVAL MOD COMPLEX 45 MIN: CPT

## 2019-01-31 PROCEDURE — 2580000003 HC RX 258: Performed by: INTERNAL MEDICINE

## 2019-01-31 PROCEDURE — 82550 ASSAY OF CK (CPK): CPT

## 2019-01-31 PROCEDURE — 6360000002 HC RX W HCPCS: Performed by: INTERNAL MEDICINE

## 2019-01-31 PROCEDURE — 96372 THER/PROPH/DIAG INJ SC/IM: CPT

## 2019-01-31 PROCEDURE — 1200000000 HC SEMI PRIVATE

## 2019-01-31 PROCEDURE — 97530 THERAPEUTIC ACTIVITIES: CPT

## 2019-01-31 PROCEDURE — 85025 COMPLETE CBC W/AUTO DIFF WBC: CPT

## 2019-01-31 PROCEDURE — 80048 BASIC METABOLIC PNL TOTAL CA: CPT

## 2019-01-31 PROCEDURE — 96366 THER/PROPH/DIAG IV INF ADDON: CPT

## 2019-01-31 RX ORDER — SIMVASTATIN 20 MG
20 TABLET ORAL NIGHTLY
Status: DISCONTINUED | OUTPATIENT
Start: 2019-02-03 | End: 2019-02-04 | Stop reason: HOSPADM

## 2019-01-31 RX ORDER — GLIPIZIDE 5 MG/1
5 TABLET ORAL 2 TIMES DAILY WITH MEALS
Status: DISCONTINUED | OUTPATIENT
Start: 2019-01-31 | End: 2019-02-03

## 2019-01-31 RX ADMIN — HEPARIN SODIUM 5000 UNITS: 5000 INJECTION INTRAVENOUS; SUBCUTANEOUS at 21:40

## 2019-01-31 RX ADMIN — Medication: at 20:09

## 2019-01-31 RX ADMIN — POTASSIUM CHLORIDE 40 MEQ: 1500 TABLET, EXTENDED RELEASE ORAL at 09:18

## 2019-01-31 RX ADMIN — Medication: at 08:30

## 2019-01-31 RX ADMIN — HEPARIN SODIUM 5000 UNITS: 5000 INJECTION INTRAVENOUS; SUBCUTANEOUS at 05:42

## 2019-01-31 RX ADMIN — GLIPIZIDE 5 MG: 5 TABLET ORAL at 08:31

## 2019-01-31 RX ADMIN — PANTOPRAZOLE SODIUM 40 MG: 40 TABLET, DELAYED RELEASE ORAL at 05:42

## 2019-01-31 RX ADMIN — HEPARIN SODIUM 5000 UNITS: 5000 INJECTION INTRAVENOUS; SUBCUTANEOUS at 15:08

## 2019-01-31 RX ADMIN — DOCUSATE SODIUM 100 MG: 100 CAPSULE, LIQUID FILLED ORAL at 20:01

## 2019-01-31 RX ADMIN — LISINOPRIL 5 MG: 5 TABLET ORAL at 08:30

## 2019-01-31 RX ADMIN — METOPROLOL SUCCINATE 50 MG: 50 TABLET, FILM COATED, EXTENDED RELEASE ORAL at 20:01

## 2019-01-31 RX ADMIN — ASPIRIN 81 MG: 81 TABLET, COATED ORAL at 20:01

## 2019-01-31 RX ADMIN — GLIPIZIDE 5 MG: 5 TABLET ORAL at 17:32

## 2019-01-31 RX ADMIN — DOCUSATE SODIUM 100 MG: 100 CAPSULE, LIQUID FILLED ORAL at 08:30

## 2019-01-31 ASSESSMENT — PAIN SCALES - GENERAL
PAINLEVEL_OUTOF10: 0

## 2019-01-31 NOTE — PROGRESS NOTES
Progress Note - Dr. Rocco Van - Internal Medicine  PCP: [id number] / [place] [id number] [address] Day: 1  Code Status: Full Code  Current Diet: DIET RENAL;        CC: follow up on medical issues    Subjective:   Marlin Markham is a 82 y.o. male. He denies problems    Doing ok  cpk trending down  Pt still confused. He is now denying that he even fell yesterday      He denies chest pain, denies shortness of breath, denies nausea,  denies emesis. 10 system Review of Systems is reviewed with patient, and pertinent positives are listed here: None . Otherwise, Review of systems is negative. I have reviewed the patient's medical and social history in detail and updated the computerized patient record. To recap: He  has a past medical history of AAA (abdominal aortic aneurysm) ([facility] CAD (coronary artery disease); Cerebral artery occlusion with cerebral infarction [facility]); DDD (degenerative disc disease), cervical; Dementia; Diabetes mellitus ([facility] Hyperlipidemia; Hypertension; Pacemaker; and PVD (peripheral vascular disease) ([facility] . He  has a past surgical history that includes Cardiac surgery (May 2007) and Leg amputation below knee. [place] He  reports that he quit smoking about 12 years ago. His smoking use included Cigarettes. He has never used smokeless tobacco. He reports that he does not drink alcohol or use drugs. .        Active Hospital Problems    Diagnosis Date Noted    Rhabdomyolysis [M62.82] 01/30/2019    HTN (hypertension) [I10] 01/30/2019    High cholesterol [E78.00] 01/30/2019    DM2 (diabetes mellitus, type 2) ([facility] [E11.9] 01/30/2019       Current Facility-Administered Medications: glipiZIDE (GLUCOTROL) tablet 5 mg, 5 mg, Oral, BID WC  aspirin EC tablet 81 mg, 81 mg, Oral, Nightly  metoprolol succinate (TOPROL XL) extended release tablet 50 mg, 50 mg, Oral, Nightly  lisinopril (PRINIVIL;ZESTRIL) tablet 5 mg, 5 mg, Oral, Daily  simvastatin (ZOCOR) tablet 20 mg, 20 mg, Oral, Nightly  sodium chloride flush 0.9 % injection 10 mL, 10 mL, Intravenous, 2 times per day  sodium chloride flush 0.9 % injection 10 mL, 10 mL, Intravenous, PRN  docusate sodium (COLACE) capsule 100 mg, 100 mg, Oral, BID  ondansetron (ZOFRAN) injection 4 mg, 4 mg, Intravenous, Q6H PRN  heparin (porcine) injection 5,000 Units, 5,000 Units, Subcutaneous, 3 times per day  potassium chloride (KLOR-CON M) extended release tablet 40 mEq, 40 mEq, Oral, PRN **OR** potassium bicarb-citric acid (EFFER-K) effervescent tablet 40 mEq, 40 mEq, Oral, PRN **OR** potassium chloride 10 mEq/100 mL IVPB (Peripheral Line), 10 mEq, Intravenous, PRN  sodium bicarbonate 150 mEq in dextrose 5 % 1,000 mL infusion, , Intravenous, Continuous  pantoprazole (PROTONIX) tablet 40 mg, 40 mg, Oral, QAM AC  glucose (GLUTOSE) 40 % oral gel 15 g, 15 g, Oral, PRN  dextrose 50 % solution 12.5 g, 12.5 g, Intravenous, PRN  glucagon (rDNA) injection 1 mg, 1 mg, Intramuscular, PRN  dextrose 5 % solution, 100 mL/hr, Intravenous, PRN         Objective:  /62   Pulse 60   Temp 97.8 °F (36.6 °C) (Temporal)   Resp 16   Ht 5' 7\" (1.702 m)   Wt 129 lb 6.6 oz (58.7 kg)   SpO2 93%   BMI 20.27 kg/m²      Patient Vitals for the past 24 hrs:   BP Temp Temp src Pulse Resp SpO2 Height Weight   01/31/19 0800 123/62 97.8 °F (36.6 °C) Temporal 60 16 93 % - -   01/31/19 0600 131/69 97.8 °F (36.6 °C) Temporal 60 16 91 % - -   01/31/19 0530 - - - - - - - 129 lb 6.6 oz (58.7 kg)   01/31/19 0122 (!) 140/70 98 °F (36.7 °C) Temporal 81 16 92 % - -   01/30/19 2323 - - - - - 94 % - -   01/30/19 1922 (!) 159/71 97.7 °F (36.5 °C) Temporal 83 16 96 % - -   01/30/19 1851 (!) 140/78 96.7 °F (35.9 °C) Temporal 76 16 94 % 5' 7\" (1.702 m) 127 lb 8 oz (57.8 kg)   01/30/19 1730 (!) 141/74 - - 75 16 96 % - -   01/30/19 1700 129/69 - - 81 17 96 % - -   01/30/19 1600 138/65 - - 79 18 95 % - -   01/30/19 1530 131/72 - - 76 18 95 % - -   01/30/19 1430 134/81 - - 85 19 are unchanged in size. There is unchanged cerebellar atrophy. There is no acute intracranial hemorrhage or mass effect. Multiple dilated perivascular spaces and/or remote lacunar infarcts involving the basal ganglia and thalami, similar to previous exam.  Chronic superior left frontal infarct posteriorly. ORBITS: The visualized portion of the orbits demonstrate no acute abnormality. SINUSES: The visualized paranasal sinuses and mastoid air cells demonstrate no acute abnormality. SOFT TISSUES/SKULL:  No acute abnormality of the visualized skull or soft tissues. No acute intracranial hemorrhage or mass effect. Xr Chest Portable    Result Date: 1/30/2019  EXAMINATION: SINGLE XRAY VIEW OF THE CHEST 1/30/2019 2:00 pm COMPARISON: December 7, 2018 HISTORY: ORDERING SYSTEM PROVIDED HISTORY: AMS TECHNOLOGIST PROVIDED HISTORY: Reason for exam:->AMS Ordering Physician Provided Reason for Exam: Fall (Pt. comes in today by [facility] from home with a fall. Pt. was found in front of his recliner. EMS reports that there was a line of stool in front of his chair from where the patient had been crawling. Pt. has history of dementia.) Acuity: Acute Type of Exam: Initial FINDINGS: Cardiac silhouette is upper limits of normal.  Left-sided cardiac device. Lungs appear clear. No acute bony abnormality. No acute findings. Assessment and Plan:  Patient Active Hospital Problem List:   Rhabdomyolysis (1/30/2019)    Assessment: Established problem. Improving. CPK trends down    Plan: cont iv bicarb drip. Cont to follow cpk and creat levels. HTN (hypertension) (1/30/2019)    Assessment: Established problem. Stable. 123/62    Plan: Continue present orders/plan. High cholesterol (1/30/2019)    Assessment: Established problem. Stable. Plan: on statin, to continue   DM2 (diabetes mellitus, type 2) ([facility] (1/30/2019)    Assessment: Established problem. Stable.  FSBS 123 this am    Plan: Continue present

## 2019-01-31 NOTE — PLAN OF CARE
Problem: Falls - Risk of:  Goal: Will remain free from falls  Will remain free from falls   Pt is wearing the fall bracelet, S.A.F.E. sign is posted outside door, and yellow blanket is on the bed. Pt informed of fall risks, verbalizes understanding, and agrees to ask for help to ambulate. Camera in room. Will monitor. Problem: Risk for Impaired Skin Integrity  Goal: Tissue integrity - skin and mucous membranes  Structural intactness and normal physiological function of skin and  mucous membranes. Intervention: TURN PATIENT  Turning pt q2h and PRN. Pt on specialty mattress. Heels elevated off of bed. Skin breakdown on coccyx d/t incontinence. Calmoseptine applied. Will monitor.

## 2019-01-31 NOTE — PROGRESS NOTES
Physical Therapy    Facility/Department: [address] NURSING  Initial Assessment    NAME: Belinda Tovar  : 1935  MRN: [id number]    Date of Service: 2019    Discharge Recommendations:Rashaun Griffin scored a  on the AM-PAC short mobility form. Current research shows that an AM-PAC score of 17 or less is typically not associated with a discharge to the patient's home setting. Based on the patients AM-PAC score and their current functional mobility deficits, it is recommended that the patient have 3-5 sessions per week of Physical Therapy at d/c to increase the patients independence. PT Equipment Recommendations  Equipment Needed: No    Patient Diagnosis(es): The primary encounter diagnosis was Frequent falls. Diagnoses of Dementia without behavioral disturbance, unspecified dementia type, Elevated CK, and Ketonuria were also pertinent to this visit. has a past medical history of AAA (abdominal aortic aneurysm) ([facility] CAD (coronary artery disease); Cerebral artery occlusion with cerebral infarction [facility]); DDD (degenerative disc disease), cervical; Dementia; Diabetes mellitus ([facility] Hyperlipidemia; Hypertension; Pacemaker; and PVD (peripheral vascular disease) ([facility] has a past surgical history that includes Cardiac surgery (May 2007) and Leg amputation below knee. Restrictions  Restrictions/Precautions  Restrictions/Precautions: Fall Risk (high fall risk)  Position Activity Restriction  Other position/activity restrictions: Pt. comes in today by [facility] from home with a fall. Pt. was found in front of his recliner. EMS reports that there was a line of stool in front of his chair from where the patient had been crawling. Pt. has history of dementia.   Vision/Hearing  Vision: Impaired (does not have there here in hospital)  Vision Exceptions: Wears glasses at all times  Hearing: Exceptions to [facility]  Hearing Exceptions: Hard of hearing/hearing concerns     Subjective  General  Chart Sit: Moderate assistance  Sit to Supine: Unable to assess (Pt remained seated in chair.)  Scooting: Moderate assistance  Comment: Pt did not initial movement with multiple VCs. Pt needing TCs to move. CGA for sitting balance EOB initially. Pt started having BM in seated position. Assisted to [facility]. Pt had large BM needed full assist with cleaning and changing brief and meplex pads. Transfers  Sit to Stand: Moderate Assistance (x 1 from bed and BSC x 2)  Stand to sit: Moderate Assistance  Stand Pivot Transfers: Moderate Assistance  Comment: stood at SW for 2 min x 2 times for hygiene and brief change. Many VC/TCs needed for safety and hand placement. Ambulation  Ambulation?: No     Balance  Posture: Fair  Sitting - Static: Fair  Sitting - Dynamic: Fair  Standing - Static: Fair;- (SW)  Standing - Dynamic: Fair;-  Comments: CGA for sitting balance, min A for standing balance with SW. Pt fatigued and needing more assist the longer he was on his feet. Assessment   Body structures, Functions, Activity limitations: Decreased functional mobility ; Decreased ADL status; Decreased strength;Decreased cognition;Decreased safe awareness;Decreased endurance;Decreased balance  Assessment: Pt with decreases mobility and safety, frequent falling and unable to care for himself at home even with some home health visits. Pt is in need of skilled PT services to address these issues. Treatment Diagnosis: weakness, difficulty with gait. Prognosis: Fair  Decision Making: Medium Complexity  Patient Education: PT POC, DC recommendations. REQUIRES PT FOLLOW UP: Yes  Activity Tolerance  Activity Tolerance: Patient limited by cognitive status; Patient limited by fatigue;Patient limited by endurance         Plan   Plan  Times per week: 2-5x/week  Times per day: Daily  Current Treatment Recommendations: Strengthening, Balance Training, Functional Mobility Training, Transfer Training, ADL/Self-care Training, Cognitive/Perceptual Training, Endurance Training, Wheelchair Mobility Training, Cognitive Reorientation, Pain Management, Home Exercise Program, Safety Education & Training, Patient/Caregiver Education & Training, Equipment Evaluation, Education, & procurement  Safety Devices  Type of devices: All fall risk precautions in place, Call light within reach, Chair alarm in place, Gait belt, Patient at risk for falls, Left in chair, Telesitter in use, Nurse notified  Restraints  Initially in place: No    G-Code     OutComes Score                                                  AM-PAC Score  AM-PAC Inpatient Mobility Raw Score : 12  AM-PAC Inpatient T-Scale Score : 35.33  Mobility Inpatient CMS 0-100% Score: 68.66  Mobility Inpatient CMS G-Code Modifier : CL          Goals  Short term goals  Time Frame for Short term goals: To be met by DC. Short term goal 1: Pt to perform bed mob with SBA. Short term goal 2: Pt to perform transfers bed to chair with CGA. Short term goal 3: Pt to perform sit to stand transfers with CGA.    Long term goals  Time Frame for Long term goals : LTGs=STGs  Patient Goals   Patient goals : Did not state       Therapy Time   Individual Concurrent Group Co-treatment   Time In 0952         Time Out 1032         Minutes 40         Timed Code Treatment Minutes: [id number], [id number]

## 2019-01-31 NOTE — DISCHARGE INSTR - COC
Continuity of Care Form    Patient Name: Wily Washburn   :  1935  MRN:  [id number]    Admit date:  2019  Discharge date:  19    Code Status Order: Full Code   Advance Directives:   [address] Directive Type of Healthcare Directive Copy in [address] Agent's Name Healthcare Agent's Phone Number    19 [phone]  Yes, patient has an advance directive for healthcare treatment  Durable power of  for health care  No, copy requested from family  Adult [address] [place] [address]  [phone]          Admitting Physician:  Love Duran MD  PCP: Jacqueline Chong    Discharging Nurse: [facility] Unit/Room#: 2TQ-7225/2330-65  Discharging Unit Phone Number: [phone]    Emergency Contact:   Extended Emergency Contact Information  Primary Emergency Contact: [place] of [address] [place] [address] Phone: [phone]  Mobile Phone: [phone]  Relation: Child  Secondary Emergency Contact: [address] of [address] [place] [address] Phone: [phone]  Relation: Child    Past Surgical History:  Past Surgical History:   Procedure Laterality Date   Sharran Gallo CARDIAC SURGERY  May 2007    CABG X 7    LEG AMPUTATION BELOW KNEE      right       Immunization History: There is no immunization history on file for this patient.     Active Problems:  Patient Active Problem List   Diagnosis Code    Rhabdomyolysis M62.82    HTN (hypertension) I10    High cholesterol E78.00    DM2 (diabetes mellitus, type 2) (HCC) E11.9       Isolation/Infection:   Isolation          No Isolation            Nurse Assessment:  Last Vital Signs: /62   Pulse 60   Temp 97.8 °F (36.6 °C) (Temporal)   Resp 16   Ht 5' 7\" (1.702 m)   Wt 129 lb 6.6 oz (58.7 kg)   SpO2 93%   BMI 20.27 kg/m²     Last documented pain score (0-10 scale): Pain Level: 0  Last Weight:   Wt Readings from Last 1 Encounters:   19 129 lb 6.6 oz (58.7 kg)     Mental Status:  Disoriented and alert    IV Access:  - None    Nursing Mobility/ADLs:  Walking   Assisted  Transfer  Assisted  Bathing  Assisted  Dressing  Assisted  Toileting  Assisted  Feeding  Independent  Med Admin  Assisted  Med Delivery   whole    Wound Care Documentation and Therapy:  Wound 01/30/19 Coccyx Mid Stage 2 (Active)   Wound Pressure Stage  2 1/31/2019  8:34 AM   Dressing Status Clean;Dry; Intact 1/31/2019  8:34 AM   Dressing Changed Changed/New 1/30/2019  8:08 PM   Dressing/Treatment Foam 1/31/2019  8:34 AM   Wound Cleansed Other (Comment) 1/30/2019  8:08 PM   Dressing Change Due 02/02/19 1/31/2019  8:34 AM   Wound Length (cm) 2.5 cm 1/30/2019  6:56 PM   Wound Width (cm) 0.25 cm 1/30/2019  6:56 PM   Wound Surface Area (cm^2) 0.62 cm^2 1/30/2019  6:56 PM   Wound Assessment Red 1/30/2019  8:08 PM   Drainage Amount Scant 1/30/2019  6:56 PM   Odor None 1/30/2019  8:08 PM   Mai-wound Assessment Blanchable erythema 1/30/2019  6:56 PM   Red%Wound Bed 100 1/30/2019  8:08 PM   Number of days: 0       Wound 01/30/19 Buttocks Right red, does not amanda (Active)   Wound Pressure Stage  1 1/31/2019  8:34 AM   Dressing Status Clean;Dry; Intact 1/31/2019  8:34 AM   Dressing Changed Changed/New 1/30/2019  8:08 PM   Dressing/Treatment Foam 1/31/2019  8:34 AM   Wound Cleansed Other (Comment) 1/30/2019  8:08 PM   Dressing Change Due 02/02/19 1/31/2019  8:34 AM   Wound Length (cm) 7.5 cm 1/30/2019  8:08 PM   Wound Width (cm) 6.5 cm 1/30/2019  8:08 PM   Wound Surface Area (cm^2) 48.75 cm^2 1/30/2019  8:08 PM   Wound Assessment Red 1/30/2019  8:08 PM   Drainage Amount None 1/30/2019  8:08 PM   Odor None 1/30/2019  8:08 PM   Margins Undefined edges 1/30/2019  8:08 PM   Mai-wound Assessment Clean;Dry; Intact 1/30/2019  8:08 PM   Red%Wound Bed 100 1/30/2019  8:08 PM   Culture Taken No 1/30/2019  8:08 PM   Number of days: 0       Wound 01/30/19 Buttocks Left red, does not amanda (Active)   Wound prosthesis of RLE  Other Treatments: n/a    Patient's personal belongings (please select all that are sent with patient):  Dentures upper and lower    RN SIGNATURE:  Electronically signed by Marie Nelson RN on 2/4/19 at 2:08 PM    CASE MANAGEMENT/SOCIAL WORK SECTION    Inpatient Status Date: 1/30/19    Readmission Risk Assessment Score:  Readmission Risk              Risk of Unplanned Readmission:        13         [facility] to [facility]  [facility]  [address]  (I) [phone] (R) [phone]    / signature: Electronically signed by Aleyda Patel on 1/31/19 at 9:04 AM    PHYSICIAN SECTION    Prognosis: Fair    Condition at Discharge: Stable    Rehab Potential (if transferring to Rehab): Good    Recommended Labs or Other Treatments After Discharge:     Physician Certification: I certify the above information and transfer of Belinda Tovar  is necessary for the continuing treatment of the diagnosis listed and that he requires [facility] for greater 30 days.      Update Admission H&P: No change in H&P    PHYSICIAN SIGNATURE:  Electronically signed by Clint Santos MD on 2/1/19 at 7:23 AM

## 2019-01-31 NOTE — CARE COORDINATION
SW spoke with pt's son regarding SNF admission to [facility]. SW explained that goal would be for pt to go to Home at [facility] for skilled care and transition to LTC if needed. Son understands and is agreeable to this plan. SW explained Medicaid process. VM to Moris Kerr ([phone]) with Admissions requesting call back. Referral submitted.     Isa Lu MSW, [address]

## 2019-01-31 NOTE — PLAN OF CARE
Problem: Falls - Risk of:  Goal: Will remain free from falls  Will remain free from falls   Bed alarm and camera engaged for patient safety.

## 2019-01-31 NOTE — PLAN OF CARE
Problem: Risk for Impaired Skin Integrity  Goal: Tissue integrity - skin and mucous membranes  Structural intactness and normal physiological function of skin and  mucous membranes. On specialty mattress to help preserve skin integrity.

## 2019-02-01 LAB
ANION GAP SERPL CALCULATED.3IONS-SCNC: 5 MMOL/L (ref 3–16)
BASOPHILS ABSOLUTE: 0 K/UL (ref 0–0.2)
BASOPHILS RELATIVE PERCENT: 0.6 %
BUN BLDV-MCNC: 13 MG/DL (ref 7–20)
CALCIUM SERPL-MCNC: 8.4 MG/DL (ref 8.3–10.6)
CHLORIDE BLD-SCNC: 98 MMOL/L (ref 99–110)
CO2: 37 MMOL/L (ref 21–32)
CREAT SERPL-MCNC: 0.8 MG/DL (ref 0.8–1.3)
EOSINOPHILS ABSOLUTE: 0.1 K/UL (ref 0–0.6)
EOSINOPHILS RELATIVE PERCENT: 2.7 %
GFR AFRICAN AMERICAN: >60
GFR NON-AFRICAN AMERICAN: >60
GLUCOSE BLD-MCNC: 101 MG/DL (ref 70–99)
GLUCOSE BLD-MCNC: 116 MG/DL (ref 70–99)
GLUCOSE BLD-MCNC: 133 MG/DL (ref 70–99)
GLUCOSE BLD-MCNC: 89 MG/DL (ref 70–99)
GLUCOSE BLD-MCNC: 91 MG/DL (ref 70–99)
GLUCOSE BLD-MCNC: 93 MG/DL (ref 70–99)
HCT VFR BLD CALC: 34.9 % (ref 40.5–52.5)
HEMOGLOBIN: 11.9 G/DL (ref 13.5–17.5)
LYMPHOCYTES ABSOLUTE: 1.1 K/UL (ref 1–5.1)
LYMPHOCYTES RELATIVE PERCENT: 23.6 %
MCH RBC QN AUTO: 30.7 PG (ref 26–34)
MCHC RBC AUTO-ENTMCNC: 34.2 G/DL (ref 31–36)
MCV RBC AUTO: 89.7 FL (ref 80–100)
MONOCYTES ABSOLUTE: 0.8 K/UL (ref 0–1.3)
MONOCYTES RELATIVE PERCENT: 17.5 %
NEUTROPHILS ABSOLUTE: 2.5 K/UL (ref 1.7–7.7)
NEUTROPHILS RELATIVE PERCENT: 55.6 %
PDW BLD-RTO: 14.2 % (ref 12.4–15.4)
PERFORMED ON: ABNORMAL
PERFORMED ON: NORMAL
PERFORMED ON: NORMAL
PLATELET # BLD: 104 K/UL (ref 135–450)
PMV BLD AUTO: 7.7 FL (ref 5–10.5)
POTASSIUM SERPL-SCNC: 3.3 MMOL/L (ref 3.5–5.1)
RBC # BLD: 3.89 M/UL (ref 4.2–5.9)
SODIUM BLD-SCNC: 140 MMOL/L (ref 136–145)
TOTAL CK: 159 U/L (ref 39–308)
WBC # BLD: 4.5 K/UL (ref 4–11)

## 2019-02-01 PROCEDURE — 1200000000 HC SEMI PRIVATE

## 2019-02-01 PROCEDURE — 85025 COMPLETE CBC W/AUTO DIFF WBC: CPT

## 2019-02-01 PROCEDURE — 6370000000 HC RX 637 (ALT 250 FOR IP): Performed by: INTERNAL MEDICINE

## 2019-02-01 PROCEDURE — 96366 THER/PROPH/DIAG IV INF ADDON: CPT

## 2019-02-01 PROCEDURE — 80048 BASIC METABOLIC PNL TOTAL CA: CPT

## 2019-02-01 PROCEDURE — 96372 THER/PROPH/DIAG INJ SC/IM: CPT

## 2019-02-01 PROCEDURE — 36415 COLL VENOUS BLD VENIPUNCTURE: CPT

## 2019-02-01 PROCEDURE — 6360000002 HC RX W HCPCS: Performed by: INTERNAL MEDICINE

## 2019-02-01 PROCEDURE — 2580000003 HC RX 258: Performed by: INTERNAL MEDICINE

## 2019-02-01 PROCEDURE — 97530 THERAPEUTIC ACTIVITIES: CPT

## 2019-02-01 PROCEDURE — 2500000003 HC RX 250 WO HCPCS: Performed by: INTERNAL MEDICINE

## 2019-02-01 PROCEDURE — 82550 ASSAY OF CK (CPK): CPT

## 2019-02-01 RX ORDER — METOPROLOL SUCCINATE 50 MG/1
50 TABLET, EXTENDED RELEASE ORAL NIGHTLY
Qty: 30 TABLET | Refills: 3
Start: 2019-02-01

## 2019-02-01 RX ADMIN — LISINOPRIL 5 MG: 5 TABLET ORAL at 09:43

## 2019-02-01 RX ADMIN — METOPROLOL SUCCINATE 50 MG: 50 TABLET, FILM COATED, EXTENDED RELEASE ORAL at 19:51

## 2019-02-01 RX ADMIN — GLIPIZIDE 5 MG: 5 TABLET ORAL at 18:42

## 2019-02-01 RX ADMIN — Medication 10 ML: at 19:51

## 2019-02-01 RX ADMIN — DOCUSATE SODIUM 100 MG: 100 CAPSULE, LIQUID FILLED ORAL at 09:42

## 2019-02-01 RX ADMIN — HEPARIN SODIUM 5000 UNITS: 5000 INJECTION INTRAVENOUS; SUBCUTANEOUS at 06:20

## 2019-02-01 RX ADMIN — POTASSIUM CHLORIDE 40 MEQ: 1500 TABLET, EXTENDED RELEASE ORAL at 06:20

## 2019-02-01 RX ADMIN — ASPIRIN 81 MG: 81 TABLET, COATED ORAL at 19:51

## 2019-02-01 RX ADMIN — HEPARIN SODIUM 5000 UNITS: 5000 INJECTION INTRAVENOUS; SUBCUTANEOUS at 15:17

## 2019-02-01 RX ADMIN — ANORECTAL OINTMENT: 15.7; .44; 24; 20.6 OINTMENT TOPICAL at 21:39

## 2019-02-01 RX ADMIN — HEPARIN SODIUM 5000 UNITS: 5000 INJECTION INTRAVENOUS; SUBCUTANEOUS at 21:32

## 2019-02-01 RX ADMIN — GLIPIZIDE 5 MG: 5 TABLET ORAL at 09:42

## 2019-02-01 RX ADMIN — PANTOPRAZOLE SODIUM 40 MG: 40 TABLET, DELAYED RELEASE ORAL at 06:20

## 2019-02-01 RX ADMIN — Medication: at 11:48

## 2019-02-01 ASSESSMENT — PAIN SCALES - GENERAL
PAINLEVEL_OUTOF10: 0

## 2019-02-01 NOTE — PROGRESS NOTES
occupational function, based on the above deficits associated with rhabdomyolysis. Pt would benefit from continued skilled acute OT services to address these deficits. Treatment Diagnosis: Decreased ADL status, endurance and balance associated with rhabdomyolysis  Prognosis: Fair;Good  Patient Education: OT eval, POC, d/c recommendation, bed mobility, safe functional transfers  Barriers to Learning: cognition  REQUIRES OT FOLLOW UP: Yes  Activity Tolerance  Activity Tolerance: Patient Tolerated treatment well  Safety Devices  Safety Devices in place: Yes  Type of devices: Left in chair; All fall risk precautions in place;Nurse notified;Call light within reach; Chair alarm in place; Patient at risk for falls (recommended to NS to use jane stedy for transfers)          Plan   Plan  Times per week: 2-5  Specific instructions for Next Treatment: COTX  Current Treatment Recommendations: Endurance Training, Functional Mobility Training, Balance Training, Self-Care / ADL, Safety Education & Training                        AM-PAC Score        AM-PAC Inpatient Daily Activity Raw Score: 15  AM-PAC Inpatient ADL T-Scale Score : 34.69  ADL Inpatient CMS 0-100% Score: 56.46  ADL Inpatient CMS G-Code Modifier : CK    Goals  Short term goals  Time Frame for Short term goals: Discharge  Short term goal 1: Min A for functional transfers to ADL surfaces - mod A to bedside chair  Short term goal 2: Set-up UB bathing/dressing - not addressed  Short term goal 3: Min A LB bathing/dressing - dep for brief  Short term goal 4: Min A for bed mobility - min A rolling, mod A supine>sit       Therapy Time   Individual Concurrent Group Co-treatment   Time In 1531         Time Out 1557         Minutes 26         Timed Code Treatment Minutes: 26 Minutes    Total Treatment Minutes:  [address] [place], [place] [id number]

## 2019-02-01 NOTE — PROGRESS NOTES
Shift assessment complete. VSS. Pt denies any pain. Call light within reach, non skid sock on, bed alarm engaged. Camera in room for safety. No needs at this time. Pt on waffle mattress, pt turned q2, calmoseptine to buttocks. Will continue to monitor.

## 2019-02-01 NOTE — DISCHARGE SUMMARY
Danial Trevino RCP    Imaging Results:  Ct Head Wo Contrast    Result Date: 1/30/2019  EXAMINATION: CT OF THE HEAD WITHOUT CONTRAST  1/30/2019 3:20 pm TECHNIQUE: CT of the head was performed without the administration of intravenous contrast. Dose modulation, iterative reconstruction, and/or weight based adjustment of the mA/kV was utilized to reduce the radiation dose to as low as reasonably achievable. COMPARISON: CT head without contrast 12/07/2018 HISTORY: ORDERING SYSTEM PROVIDED HISTORY: [facility] TECHNOLOGIST PROVIDED HISTORY: Has a \"code stroke\" or \"stroke alert\" been called? ->No Ordering Physician Provided Reason for Exam: AMS Acuity: Unknown Type of Exam: Unknown FINDINGS: BRAIN/VENTRICLES: The ventricles are unchanged in size. There is unchanged cerebellar atrophy. There is no acute intracranial hemorrhage or mass effect. Multiple dilated perivascular spaces and/or remote lacunar infarcts involving the basal ganglia and thalami, similar to previous exam.  Chronic superior left frontal infarct posteriorly. ORBITS: The visualized portion of the orbits demonstrate no acute abnormality. SINUSES: The visualized paranasal sinuses and mastoid air cells demonstrate no acute abnormality. SOFT TISSUES/SKULL:  No acute abnormality of the visualized skull or soft tissues. No acute intracranial hemorrhage or mass effect. Xr Chest Portable    Result Date: 1/30/2019  EXAMINATION: SINGLE XRAY VIEW OF THE CHEST 1/30/2019 2:00 pm COMPARISON: December 7, 2018 HISTORY: ORDERING SYSTEM PROVIDED HISTORY: [facility] TECHNOLOGIST PROVIDED HISTORY: Reason for exam:->AMS Ordering Physician Provided Reason for Exam: Fall (Pt. comes in today by [facility] from home with a fall. Pt. was found in front of his recliner. EMS reports that there was a line of stool in front of his chair from where the patient had been crawling.  Pt. has history of dementia.) Acuity: Acute Type of Exam: Initial FINDINGS: Cardiac silhouette is upper limits of

## 2019-02-01 NOTE — PLAN OF CARE
Problem: Risk for Impaired Skin Integrity  Goal: Tissue integrity - skin and mucous membranes  Structural intactness and normal physiological function of skin and  mucous membranes. Outcome: Ongoing  Monitor skin q shift. Pt on waffle mattress, turned q2, repositioned with pillows, heels elevated off bed. Pt has shearing to buttocks and redness.

## 2019-02-02 PROBLEM — E87.6 HYPOKALEMIA: Status: ACTIVE | Noted: 2019-02-02

## 2019-02-02 PROBLEM — Z89.211: Status: ACTIVE | Noted: 2019-02-02

## 2019-02-02 LAB
ANION GAP SERPL CALCULATED.3IONS-SCNC: 10 MMOL/L (ref 3–16)
BASOPHILS ABSOLUTE: 0 K/UL (ref 0–0.2)
BASOPHILS RELATIVE PERCENT: 0.6 %
BUN BLDV-MCNC: 10 MG/DL (ref 7–20)
CALCIUM SERPL-MCNC: 8.7 MG/DL (ref 8.3–10.6)
CHLORIDE BLD-SCNC: 98 MMOL/L (ref 99–110)
CO2: 32 MMOL/L (ref 21–32)
CREAT SERPL-MCNC: 0.6 MG/DL (ref 0.8–1.3)
EOSINOPHILS ABSOLUTE: 0.2 K/UL (ref 0–0.6)
EOSINOPHILS RELATIVE PERCENT: 4.4 %
GFR AFRICAN AMERICAN: >60
GFR NON-AFRICAN AMERICAN: >60
GLUCOSE BLD-MCNC: 105 MG/DL (ref 70–99)
GLUCOSE BLD-MCNC: 105 MG/DL (ref 70–99)
GLUCOSE BLD-MCNC: 106 MG/DL (ref 70–99)
GLUCOSE BLD-MCNC: 137 MG/DL (ref 70–99)
GLUCOSE BLD-MCNC: 142 MG/DL (ref 70–99)
GLUCOSE BLD-MCNC: 215 MG/DL (ref 70–99)
HCT VFR BLD CALC: 35.8 % (ref 40.5–52.5)
HEMOGLOBIN: 12.3 G/DL (ref 13.5–17.5)
LYMPHOCYTES ABSOLUTE: 1.2 K/UL (ref 1–5.1)
LYMPHOCYTES RELATIVE PERCENT: 22.1 %
MCH RBC QN AUTO: 31.1 PG (ref 26–34)
MCHC RBC AUTO-ENTMCNC: 34.3 G/DL (ref 31–36)
MCV RBC AUTO: 90.8 FL (ref 80–100)
MONOCYTES ABSOLUTE: 0.8 K/UL (ref 0–1.3)
MONOCYTES RELATIVE PERCENT: 14.6 %
NEUTROPHILS ABSOLUTE: 3.1 K/UL (ref 1.7–7.7)
NEUTROPHILS RELATIVE PERCENT: 58.3 %
PDW BLD-RTO: 13.9 % (ref 12.4–15.4)
PERFORMED ON: ABNORMAL
PLATELET # BLD: 106 K/UL (ref 135–450)
PMV BLD AUTO: 8.2 FL (ref 5–10.5)
POTASSIUM SERPL-SCNC: 3.3 MMOL/L (ref 3.5–5.1)
RBC # BLD: 3.94 M/UL (ref 4.2–5.9)
SODIUM BLD-SCNC: 140 MMOL/L (ref 136–145)
TOTAL CK: 110 U/L (ref 39–308)
WBC # BLD: 5.3 K/UL (ref 4–11)

## 2019-02-02 PROCEDURE — 2500000003 HC RX 250 WO HCPCS: Performed by: INTERNAL MEDICINE

## 2019-02-02 PROCEDURE — 1200000000 HC SEMI PRIVATE

## 2019-02-02 PROCEDURE — 6370000000 HC RX 637 (ALT 250 FOR IP): Performed by: INTERNAL MEDICINE

## 2019-02-02 PROCEDURE — 36415 COLL VENOUS BLD VENIPUNCTURE: CPT

## 2019-02-02 PROCEDURE — 2580000003 HC RX 258: Performed by: INTERNAL MEDICINE

## 2019-02-02 PROCEDURE — 96372 THER/PROPH/DIAG INJ SC/IM: CPT

## 2019-02-02 PROCEDURE — 85025 COMPLETE CBC W/AUTO DIFF WBC: CPT

## 2019-02-02 PROCEDURE — 96366 THER/PROPH/DIAG IV INF ADDON: CPT

## 2019-02-02 PROCEDURE — 6360000002 HC RX W HCPCS: Performed by: INTERNAL MEDICINE

## 2019-02-02 PROCEDURE — 80048 BASIC METABOLIC PNL TOTAL CA: CPT

## 2019-02-02 PROCEDURE — 82550 ASSAY OF CK (CPK): CPT

## 2019-02-02 RX ORDER — POTASSIUM CHLORIDE 750 MG/1
20 TABLET, FILM COATED, EXTENDED RELEASE ORAL 2 TIMES DAILY
Status: DISCONTINUED | OUTPATIENT
Start: 2019-02-02 | End: 2019-02-04 | Stop reason: HOSPADM

## 2019-02-02 RX ADMIN — HEPARIN SODIUM 5000 UNITS: 5000 INJECTION INTRAVENOUS; SUBCUTANEOUS at 21:48

## 2019-02-02 RX ADMIN — GLIPIZIDE 5 MG: 5 TABLET ORAL at 18:25

## 2019-02-02 RX ADMIN — Medication: at 22:45

## 2019-02-02 RX ADMIN — PANTOPRAZOLE SODIUM 40 MG: 40 TABLET, DELAYED RELEASE ORAL at 06:23

## 2019-02-02 RX ADMIN — ANORECTAL OINTMENT: 15.7; .44; 24; 20.6 OINTMENT TOPICAL at 15:15

## 2019-02-02 RX ADMIN — POTASSIUM CHLORIDE 20 MEQ: 750 TABLET, FILM COATED, EXTENDED RELEASE ORAL at 20:05

## 2019-02-02 RX ADMIN — HEPARIN SODIUM 5000 UNITS: 5000 INJECTION INTRAVENOUS; SUBCUTANEOUS at 06:23

## 2019-02-02 RX ADMIN — ANORECTAL OINTMENT: 15.7; .44; 24; 20.6 OINTMENT TOPICAL at 00:02

## 2019-02-02 RX ADMIN — ANORECTAL OINTMENT: 15.7; .44; 24; 20.6 OINTMENT TOPICAL at 04:20

## 2019-02-02 RX ADMIN — ANORECTAL OINTMENT: 15.7; .44; 24; 20.6 OINTMENT TOPICAL at 21:59

## 2019-02-02 RX ADMIN — Medication 10 ML: at 20:05

## 2019-02-02 RX ADMIN — ASPIRIN 81 MG: 81 TABLET, COATED ORAL at 20:05

## 2019-02-02 RX ADMIN — Medication: at 00:02

## 2019-02-02 RX ADMIN — HEPARIN SODIUM 5000 UNITS: 5000 INJECTION INTRAVENOUS; SUBCUTANEOUS at 15:15

## 2019-02-02 RX ADMIN — LISINOPRIL 5 MG: 5 TABLET ORAL at 09:28

## 2019-02-02 RX ADMIN — Medication 10 ML: at 09:29

## 2019-02-02 RX ADMIN — ANORECTAL OINTMENT: 15.7; .44; 24; 20.6 OINTMENT TOPICAL at 06:31

## 2019-02-02 RX ADMIN — GLIPIZIDE 5 MG: 5 TABLET ORAL at 09:28

## 2019-02-02 RX ADMIN — METOPROLOL SUCCINATE 50 MG: 50 TABLET, FILM COATED, EXTENDED RELEASE ORAL at 20:05

## 2019-02-02 RX ADMIN — ANORECTAL OINTMENT: 15.7; .44; 24; 20.6 OINTMENT TOPICAL at 23:52

## 2019-02-02 ASSESSMENT — PAIN SCALES - GENERAL
PAINLEVEL_OUTOF10: 0

## 2019-02-02 NOTE — PLAN OF CARE
Problem: Falls - Risk of:  Goal: Will remain free from falls  Will remain free from falls   Outcome: Ongoing  Fall precautions in place, bed alarm on, nonskid foot wear applied, bed in lowest position, and call light within reach. Camera in place for safety. Problem: Risk for Impaired Skin Integrity  Goal: Tissue integrity - skin and mucous membranes  Structural intactness and normal physiological function of skin and  mucous membranes. Outcome: Ongoing  Skin inspected during routine assessment. Pt assisted with turning at least every 2 hours. Pt checked for incontinence of both stool and urine at least every 2 hours.

## 2019-02-02 NOTE — PROGRESS NOTES
Department of Internal Medicine  General Internal Medicine   Progress Note      SUBJECTIVE: feeling weak , appetite slightly improved  Denies chest pain or SOB     History obtained from chart review and the patient  General ROS: positive for  - fatigue and malaise  negative for - chills, fever or night sweats  Psychological ROS: positive for - disorientation and memory difficulties  negative for - anxiety, hallucinations or hostility  Ophthalmic ROS: negative  Respiratory ROS: no cough, shortness of breath, or wheezing  Cardiovascular ROS: no chest pain or dyspnea on exertion  Gastrointestinal ROS: no abdominal pain, change in bowel habits, or black or bloody stools  Genito-Urinary ROS: no dysuria, trouble voiding, or hematuria  Musculoskeletal ROS: amputation status right LE   Neurological ROS: no TIA or stroke symptoms  Dermatological ROS: negative    OBJECTIVE      Medications      Current Facility-Administered Medications: glipiZIDE (GLUCOTROL) tablet 5 mg, 5 mg, Oral, BID WC  [START ON 2/3/2019] simvastatin (ZOCOR) tablet 20 mg, 20 mg, Oral, Nightly  menthol-zinc oxide (CALMOSEPTINE) 0.44-20.6 % ointment, , Topical, BID PRN  influenza quadrivalent split vaccine (FLUZONE;FLUARIX;FLULAVAL;AFLURIA) injection 0.5 mL, 0.5 mL, Intramuscular, Once  pneumococcal 13-valent conjugate (PREVNAR) injection 0.5 mL, 0.5 mL, Intramuscular, Once  aspirin EC tablet 81 mg, 81 mg, Oral, Nightly  metoprolol succinate (TOPROL XL) extended release tablet 50 mg, 50 mg, Oral, Nightly  lisinopril (PRINIVIL;ZESTRIL) tablet 5 mg, 5 mg, Oral, Daily  sodium chloride flush 0.9 % injection 10 mL, 10 mL, Intravenous, 2 times per day  sodium chloride flush 0.9 % injection 10 mL, 10 mL, Intravenous, PRN  docusate sodium (COLACE) capsule 100 mg, 100 mg, Oral, BID  ondansetron (ZOFRAN) injection 4 mg, 4 mg, Intravenous, Q6H PRN  heparin (porcine) injection 5,000 Units, 5,000 Units, Subcutaneous, 3 times per day  potassium chloride (KLOR-CON M)

## 2019-02-03 LAB
ANION GAP SERPL CALCULATED.3IONS-SCNC: 8 MMOL/L (ref 3–16)
BUN BLDV-MCNC: 9 MG/DL (ref 7–20)
CALCIUM SERPL-MCNC: 9.2 MG/DL (ref 8.3–10.6)
CHLORIDE BLD-SCNC: 101 MMOL/L (ref 99–110)
CO2: 31 MMOL/L (ref 21–32)
CREAT SERPL-MCNC: 0.7 MG/DL (ref 0.8–1.3)
GFR AFRICAN AMERICAN: >60
GFR NON-AFRICAN AMERICAN: >60
GLUCOSE BLD-MCNC: 102 MG/DL (ref 70–99)
GLUCOSE BLD-MCNC: 110 MG/DL (ref 70–99)
GLUCOSE BLD-MCNC: 139 MG/DL (ref 70–99)
GLUCOSE BLD-MCNC: 72 MG/DL (ref 70–99)
GLUCOSE BLD-MCNC: 88 MG/DL (ref 70–99)
PERFORMED ON: ABNORMAL
PERFORMED ON: NORMAL
POTASSIUM SERPL-SCNC: 3.6 MMOL/L (ref 3.5–5.1)
SODIUM BLD-SCNC: 140 MMOL/L (ref 136–145)

## 2019-02-03 PROCEDURE — 80048 BASIC METABOLIC PNL TOTAL CA: CPT

## 2019-02-03 PROCEDURE — 2580000003 HC RX 258: Performed by: INTERNAL MEDICINE

## 2019-02-03 PROCEDURE — 1200000000 HC SEMI PRIVATE

## 2019-02-03 PROCEDURE — 6370000000 HC RX 637 (ALT 250 FOR IP): Performed by: INTERNAL MEDICINE

## 2019-02-03 PROCEDURE — 6360000002 HC RX W HCPCS: Performed by: INTERNAL MEDICINE

## 2019-02-03 PROCEDURE — 36415 COLL VENOUS BLD VENIPUNCTURE: CPT

## 2019-02-03 PROCEDURE — 96366 THER/PROPH/DIAG IV INF ADDON: CPT

## 2019-02-03 PROCEDURE — 96372 THER/PROPH/DIAG INJ SC/IM: CPT

## 2019-02-03 RX ADMIN — ANORECTAL OINTMENT: 15.7; .44; 24; 20.6 OINTMENT TOPICAL at 02:07

## 2019-02-03 RX ADMIN — HEPARIN SODIUM 5000 UNITS: 5000 INJECTION INTRAVENOUS; SUBCUTANEOUS at 21:13

## 2019-02-03 RX ADMIN — POTASSIUM CHLORIDE 20 MEQ: 750 TABLET, FILM COATED, EXTENDED RELEASE ORAL at 08:27

## 2019-02-03 RX ADMIN — ASPIRIN 81 MG: 81 TABLET, COATED ORAL at 21:13

## 2019-02-03 RX ADMIN — POTASSIUM CHLORIDE 20 MEQ: 750 TABLET, FILM COATED, EXTENDED RELEASE ORAL at 21:13

## 2019-02-03 RX ADMIN — PANTOPRAZOLE SODIUM 40 MG: 40 TABLET, DELAYED RELEASE ORAL at 06:28

## 2019-02-03 RX ADMIN — GLIPIZIDE 5 MG: 5 TABLET ORAL at 08:27

## 2019-02-03 RX ADMIN — Medication 10 ML: at 08:28

## 2019-02-03 RX ADMIN — HEPARIN SODIUM 5000 UNITS: 5000 INJECTION INTRAVENOUS; SUBCUTANEOUS at 06:28

## 2019-02-03 RX ADMIN — METOPROLOL SUCCINATE 50 MG: 50 TABLET, FILM COATED, EXTENDED RELEASE ORAL at 21:13

## 2019-02-03 RX ADMIN — SIMVASTATIN 20 MG: 20 TABLET, FILM COATED ORAL at 21:13

## 2019-02-03 RX ADMIN — Medication 10 ML: at 21:19

## 2019-02-03 RX ADMIN — ANORECTAL OINTMENT: 15.7; .44; 24; 20.6 OINTMENT TOPICAL at 04:34

## 2019-02-03 RX ADMIN — LISINOPRIL 5 MG: 5 TABLET ORAL at 08:27

## 2019-02-03 RX ADMIN — HEPARIN SODIUM 5000 UNITS: 5000 INJECTION INTRAVENOUS; SUBCUTANEOUS at 14:11

## 2019-02-03 ASSESSMENT — PAIN SCALES - GENERAL
PAINLEVEL_OUTOF10: 0
PAINLEVEL_OUTOF10: 0

## 2019-02-03 NOTE — CARE COORDINATION
Called Oly at the [facility] to check on pending status for admission. Jozef Everett is waiting until Monday to re-evaluate patient's medicaid eligibility since he only has 4 skilled days remaining for this spell of illness.

## 2019-02-03 NOTE — PROGRESS NOTES
Patient checked for incontinence Q2H. Per report, patient was found down at home with rug burn/excoriation on bilateral buttocks. Calmoseptine applied due to excoriation and further skin breakdown prevention.

## 2019-02-03 NOTE — PROGRESS NOTES
Department of Internal Medicine  General Internal Medicine   Progress Note      SUBJECTIVE: feeling weak , some what unsteady denies chest pain or SOB     History obtained from chart review and the patient  General ROS: positive for  - fatigue and malaise  negative for - chills, fever or night sweats  Psychological ROS: positive for - disorientation and memory difficulties  negative for - anxiety, hallucinations or hostility  Ophthalmic ROS: negative  Respiratory ROS: no cough, shortness of breath, or wheezing  Cardiovascular ROS: no chest pain or dyspnea on exertion  Gastrointestinal ROS: no abdominal pain, change in bowel habits, or black or bloody stools  Genito-Urinary ROS: no dysuria, trouble voiding, or hematuria  Musculoskeletal ROS: amputation status right LE   Neurological ROS: no TIA or stroke symptoms  Dermatological ROS: negative    OBJECTIVE      Medications      Current Facility-Administered Medications: linagliptin (TRADJENTA) tablet 5 mg, 5 mg, Oral, Daily  potassium chloride (KLOR-CON) extended release tablet 20 mEq, 20 mEq, Oral, BID  simvastatin (ZOCOR) tablet 20 mg, 20 mg, Oral, Nightly  menthol-zinc oxide (CALMOSEPTINE) 0.44-20.6 % ointment, , Topical, BID PRN  influenza quadrivalent split vaccine (FLUZONE;FLUARIX;FLULAVAL;AFLURIA) injection 0.5 mL, 0.5 mL, Intramuscular, Once  pneumococcal 13-valent conjugate (PREVNAR) injection 0.5 mL, 0.5 mL, Intramuscular, Once  aspirin EC tablet 81 mg, 81 mg, Oral, Nightly  metoprolol succinate (TOPROL XL) extended release tablet 50 mg, 50 mg, Oral, Nightly  lisinopril (PRINIVIL;ZESTRIL) tablet 5 mg, 5 mg, Oral, Daily  sodium chloride flush 0.9 % injection 10 mL, 10 mL, Intravenous, 2 times per day  sodium chloride flush 0.9 % injection 10 mL, 10 mL, Intravenous, PRN  docusate sodium (COLACE) capsule 100 mg, 100 mg, Oral, BID  ondansetron (ZOFRAN) injection 4 mg, 4 mg, Intravenous, Q6H PRN  heparin (porcine) injection 5,000 Units, 5,000 Units, Subcutaneous,

## 2019-02-03 NOTE — PLAN OF CARE
Problem: Falls - Risk of:  Goal: Will remain free from falls  Will remain free from falls   Outcome: Ongoing  Fall precautions in place, bed alarm on, nonskid foot wear applied, bed in lowest position, and call light within reach. Camera in place for safety. Will continue to monitor. Problem: Risk for Impaired Skin Integrity  Goal: Tissue integrity - skin and mucous membranes  Structural intactness and normal physiological function of skin and  mucous membranes. Outcome: Ongoing  Skin inspected during routine assessment. Pt assisted with turning at least every 2 hours. Pt checked for incontinence of both stool and urine at least every 2 hours.

## 2019-02-04 VITALS
OXYGEN SATURATION: 92 % | HEART RATE: 60 BPM | HEIGHT: 67 IN | SYSTOLIC BLOOD PRESSURE: 114 MMHG | RESPIRATION RATE: 18 BRPM | TEMPERATURE: 98 F | WEIGHT: 136.91 LBS | BODY MASS INDEX: 21.49 KG/M2 | DIASTOLIC BLOOD PRESSURE: 67 MMHG

## 2019-02-04 PROBLEM — Z89.211: Status: RESOLVED | Noted: 2019-02-02 | Resolved: 2019-02-04

## 2019-02-04 LAB
BLOOD CULTURE, ROUTINE: NORMAL
CULTURE, BLOOD 2: NORMAL
GLUCOSE BLD-MCNC: 113 MG/DL (ref 70–99)
GLUCOSE BLD-MCNC: 203 MG/DL (ref 70–99)
GLUCOSE BLD-MCNC: 92 MG/DL (ref 70–99)
PERFORMED ON: ABNORMAL
PERFORMED ON: ABNORMAL
PERFORMED ON: NORMAL

## 2019-02-04 PROCEDURE — 97530 THERAPEUTIC ACTIVITIES: CPT

## 2019-02-04 PROCEDURE — 96372 THER/PROPH/DIAG INJ SC/IM: CPT

## 2019-02-04 PROCEDURE — 2580000003 HC RX 258: Performed by: INTERNAL MEDICINE

## 2019-02-04 PROCEDURE — 6360000002 HC RX W HCPCS: Performed by: INTERNAL MEDICINE

## 2019-02-04 PROCEDURE — 97535 SELF CARE MNGMENT TRAINING: CPT

## 2019-02-04 PROCEDURE — 6370000000 HC RX 637 (ALT 250 FOR IP): Performed by: INTERNAL MEDICINE

## 2019-02-04 PROCEDURE — 97110 THERAPEUTIC EXERCISES: CPT

## 2019-02-04 RX ADMIN — Medication 10 ML: at 10:18

## 2019-02-04 RX ADMIN — ANORECTAL OINTMENT: 15.7; .44; 24; 20.6 OINTMENT TOPICAL at 10:18

## 2019-02-04 RX ADMIN — LINAGLIPTIN 5 MG: 5 TABLET, FILM COATED ORAL at 10:18

## 2019-02-04 RX ADMIN — HEPARIN SODIUM 5000 UNITS: 5000 INJECTION INTRAVENOUS; SUBCUTANEOUS at 05:03

## 2019-02-04 RX ADMIN — POTASSIUM CHLORIDE 20 MEQ: 750 TABLET, FILM COATED, EXTENDED RELEASE ORAL at 10:17

## 2019-02-04 RX ADMIN — PANTOPRAZOLE SODIUM 40 MG: 40 TABLET, DELAYED RELEASE ORAL at 05:03

## 2019-02-04 RX ADMIN — LISINOPRIL 5 MG: 5 TABLET ORAL at 10:18

## 2019-02-04 ASSESSMENT — PAIN SCALES - GENERAL
PAINLEVEL_OUTOF10: 0

## 2019-02-04 NOTE — CARE COORDINATION
Pt accepted to SNF this date:    Discharge Plan:     Patient discharged to:  Discharging to Facility/ [facility]   Fax: [phone]  Report: [phone] [facility] Unit nurse)  SW/DC Planner faxed, [phone] and [address]   Narcotic Prescriptions faxed were: none  RN: Mehdi Enrique will call report       Medical Transport with: [address] [facility]  [phone]   time: 6pm  Family advised of discharge?: yes- Son made aware  HENS Submitted?: yes    Nicole Nicolas [facility]  [phone]   All discharge needs met per case management.

## 2019-02-04 NOTE — PROGRESS NOTES
Progress Note - Dr. Maricel Whitney - Internal Medicine  PCP: [id number] [place] [address] [address] Day: 5  Code Status: Full Code  Current Diet: DIET RENAL; Carb Control: 4 carb choices (60 gms)/meal        CC: follow up on medical issues    Subjective:   Laurel Vallecillo is a 82 y.o. male. He denies problems    Pt is extremely belligerent today  During entire visit, he was yelling at me asking why I was here and what we were doing for him    Tried to re-explain why he was in hospital    Awaiting acceptance at SNF  Remains stable for d/c    He denies chest pain, denies shortness of breath, denies nausea,  denies emesis. 10 system Review of Systems is reviewed with patient, and pertinent positives are listed here: None . Otherwise, Review of systems is negative. I have reviewed the patient's medical and social history in detail and updated the computerized patient record. To recap: He  has a past medical history of AAA [facility]abdominal [facility]) ([facility] CAD (coronary artery disease); Cerebral artery occlusion with cerebral infarction [facility]); DDD (degenerative disc disease), cervical; Dementia; Diabetes mellitus ([facility] Hyperlipidemia; Hypertension; Pacemaker; and PVD (peripheral vascular disease) ([facility] . He  has a past surgical history that includes Cardiac surgery (May 2007) and Leg amputation below knee. Alyson Winston He  reports that he quit smoking about 12 years ago. His smoking use included Cigarettes. He has never used smokeless tobacco. He reports that he does not drink alcohol or use drugs. .        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 [Z89.211] 02/02/2019    Hypokalemia [E87.6] 02/02/2019    Rhabdomyolysis [M62.82] 01/30/2019    HTN (hypertension) [I10] 01/30/2019    High cholesterol [E78.00] 01/30/2019    DM2 (diabetes mellitus, type 2) ([facility] [E11.9] 01/30/2019       Current Facility-Administered Medications: linagliptin (TRADJENTA) tablet 5 mg, 5 mg, Oral, Daily  potassium chloride (KLOR-CON) extended release tablet 20 mEq, 20 mEq, Oral, BID  simvastatin (ZOCOR) tablet 20 mg, 20 mg, Oral, Nightly  menthol-zinc oxide (CALMOSEPTINE) 0.44-20.6 % ointment, , Topical, BID PRN  influenza quadrivalent split vaccine (FLUZONE;FLUARIX;FLULAVAL;AFLURIA) injection 0.5 mL, 0.5 mL, Intramuscular, Once  pneumococcal 13-valent conjugate (PREVNAR) injection 0.5 mL, 0.5 mL, Intramuscular, Once  aspirin EC tablet 81 mg, 81 mg, Oral, Nightly  metoprolol succinate (TOPROL XL) extended release tablet 50 mg, 50 mg, Oral, Nightly  lisinopril (PRINIVIL;ZESTRIL) tablet 5 mg, 5 mg, Oral, Daily  sodium chloride flush 0.9 % injection 10 mL, 10 mL, Intravenous, 2 times per day  sodium chloride flush 0.9 % injection 10 mL, 10 mL, Intravenous, PRN  docusate sodium (COLACE) capsule 100 mg, 100 mg, Oral, BID  ondansetron (ZOFRAN) injection 4 mg, 4 mg, Intravenous, Q6H PRN  heparin (porcine) injection 5,000 Units, 5,000 Units, Subcutaneous, 3 times per day  potassium chloride (KLOR-CON M) extended release tablet 40 mEq, 40 mEq, Oral, PRN **OR** potassium bicarb-citric acid (EFFER-K) effervescent tablet 40 mEq, 40 mEq, Oral, PRN **OR** potassium chloride 10 mEq/100 mL IVPB (Peripheral Line), 10 mEq, Intravenous, PRN  pantoprazole (PROTONIX) tablet 40 mg, 40 mg, Oral, QAM AC  glucose (GLUTOSE) 40 % oral gel 15 g, 15 g, Oral, PRN  dextrose 50 % solution 12.5 g, 12.5 g, Intravenous, PRN  glucagon (rDNA) injection 1 mg, 1 mg, Intramuscular, PRN  dextrose 5 % solution, 100 mL/hr, Intravenous, PRN         Objective:  BP (!) 144/78   Pulse 60   Temp 97.9 °F (36.6 °C) (Temporal)   Resp 16   Ht 5' 7\" (1.702 m)   Wt 136 lb 14.5 oz (62.1 kg)   SpO2 92%   BMI 21.44 kg/m²      Patient Vitals for the past 24 hrs:   BP Temp Temp src Pulse Resp SpO2   02/04/19 0430 (!) 144/78 97.9 °F (36.6 °C) Temporal 60 16 92 %   02/04/19 0000 122/65 97.8 °F (36.6 °C) Temporal 60 16 91 %   02/03/19 2100 120/65 98.2 °F (36.8 °C) Temporal 65 - 92 %   02/03/19 1715 125/69 97.2 °F (36.2 °C) Temporal 60 16 93 %   02/03/19 1227 107/64 97 °F (36.1 °C) Temporal 60 14 90 %     Patient Vitals for the past 96 hrs (Last 3 readings):   Weight   02/03/19 0206 136 lb 14.5 oz (62.1 kg)   02/02/19 0414 137 lb 12.6 oz (62.5 kg)   02/01/19 0400 130 lb 4.7 oz (59.1 kg)           Intake/Output Summary (Last 24 hours) at 02/04/19 9720  Last data filed at 02/04/19 0208   Gross per 24 hour   Intake              960 ml   Output              400 ml   Net              560 ml         Physical Exam:   S1, S2 normal, no murmur, rub or gallop, regular rate and rhythm  clear to auscultation bilaterally  abdomen is soft without significant tenderness, masses, organomegaly or guarding  extremities normal, atraumatic, no cyanosis or edema    Labs:  Lab Results   Component Value Date    WBC 5.3 02/02/2019    HGB 12.3 (L) 02/02/2019    HCT 35.8 (L) 02/02/2019     (L) 02/02/2019    ALT 26 01/30/2019    AST 56 (H) 01/30/2019     02/03/2019    K 3.6 02/03/2019     02/03/2019    CREATININE 0.7 (L) 02/03/2019    BUN 9 02/03/2019    CO2 31 02/03/2019    INR 1.17 (H) 01/30/2019    LABMICR YES 01/30/2019     Lab Results   Component Value Date    CKTOTAL 110 02/02/2019    TROPONINI <0.01 01/30/2019       Recent Imaging Results are Reviewed:  Ct Head Wo Contrast    Result Date: 1/30/2019  EXAMINATION: CT OF THE HEAD WITHOUT CONTRAST  1/30/2019 3:20 pm TECHNIQUE: CT of the head was performed without the administration of intravenous contrast. Dose modulation, iterative reconstruction, and/or weight based adjustment of the mA/kV was utilized to reduce the radiation dose to as low as reasonably achievable. COMPARISON: CT head without contrast 12/07/2018 HISTORY: ORDERING SYSTEM PROVIDED HISTORY: AMS TECHNOLOGIST PROVIDED HISTORY: Has a \"code stroke\" or \"stroke alert\" been called? ->No Ordering Physician Provided Reason for Exam: AMS Acuity: Unknown Type of Exam: Unknown FINDINGS: BRAIN/VENTRICLES: The ventricles are unchanged in size. There is unchanged cerebellar atrophy. There is no acute intracranial hemorrhage or mass effect. Multiple dilated perivascular spaces and/or remote lacunar infarcts involving the basal ganglia and thalami, similar to previous exam.  Chronic superior left frontal infarct posteriorly. ORBITS: The visualized portion of the orbits demonstrate no acute abnormality. SINUSES: The visualized paranasal sinuses and mastoid air cells demonstrate no acute abnormality. SOFT TISSUES/SKULL:  No acute abnormality of the visualized skull or soft tissues. No acute intracranial hemorrhage or mass effect. Xr Chest Portable    Result Date: 1/30/2019  EXAMINATION: SINGLE XRAY VIEW OF THE CHEST 1/30/2019 2:00 pm COMPARISON: December 7, 2018 HISTORY: ORDERING SYSTEM PROVIDED HISTORY: AMS TECHNOLOGIST PROVIDED HISTORY: Reason for exam:->AMS Ordering Physician Provided Reason for Exam: Fall (Pt. comes in today by [facility] from home with a fall. Pt. was found in front of his recliner. EMS reports that there was a line of stool in front of his chair from where the patient had been crawling. Pt. has history of dementia.) Acuity: Acute Type of Exam: Initial FINDINGS: Cardiac silhouette is upper limits of normal.  Left-sided cardiac device. Lungs appear clear. No acute bony abnormality. No acute findings. Assessment and Plan:  Patient Active Hospital Problem List:   Rhabdomyolysis (1/30/2019)    Assessment: Established problem, now resolved. Plan: no longer on bicarb drip. Try to keep mobile to prevent recurrent rhabdo   HTN (hypertension) (1/30/2019)    Assessment: Established problem. Stable. 144/78    Plan: see how bp does with am meds   High cholesterol (1/30/2019)    Assessment: Established problem. Stable. Plan: Continue present orders/plan.     DM2 (diabetes mellitus, type 2) ([facility] (1/30/2019)    Assessment: Established problem. Improving. FSBS 92 this am    Plan: cont ccc diet, sliding scale   Hypokalemia (2/2/2019)    Assessment: Established problem, now resolved. K 3.6 this am    Plan: Continue present orders/plan. Disp - to SNF, hopefully today.  Remains medically stable for d/c              Kriss [facility]  2/4/2019

## 2019-02-04 NOTE — PLAN OF CARE
Problem: Falls - Risk of:  Goal: Will remain free from falls  Will remain free from falls   Outcome: Ongoing    Goal: Absence of physical injury  Absence of physical injury   Outcome: Ongoing      Problem: Risk for Impaired Skin Integrity  Goal: Tissue integrity - skin and mucous membranes  Structural intactness and normal physiological function of skin and  mucous membranes.    Outcome: Ongoing      Problem: Musculor/Skeletal Functional Status  Goal: Highest potential functional level  Outcome: Ongoing    Goal: Absence of falls  Outcome: Ongoing

## 2019-02-04 NOTE — PROGRESS NOTES
Physical Therapy  Facility/Department: [facility] 3A NURSING  Daily Treatment Note  NAME: Chichi Patel  : 1935  MRN: [id number]    Date of Service: 2019    Discharge Recommendations:Rashaun Swan scored a 15/24 on the AM-PAC short mobility form. Current research shows that an AM-PAC score of 17 or less is typically not associated with a discharge to the patient's home setting. Based on the patients AM-PAC score and their current functional mobility deficits, it is recommended that the patient have 3-5 sessions per week of Physical Therapy at d/c to increase the patients independence. PT Equipment Recommendations  Equipment Needed: No    Patient Diagnosis(es): The primary encounter diagnosis was Frequent falls. Diagnoses of Dementia without behavioral disturbance, unspecified dementia type, Elevated CK, and Ketonuria were also pertinent to this visit. has a past medical history of AAA (abdominal aortic aneurysm) ([facility] CAD (coronary artery disease); Cerebral artery occlusion with cerebral infarction [facility]); DDD (degenerative disc disease), cervical; Dementia; Diabetes mellitus ([facility] Hyperlipidemia; Hypertension; Pacemaker; and PVD (peripheral vascular disease) ([facility] has a past surgical history that includes Cardiac surgery (May 2007) and Leg amputation below knee. Restrictions  Restrictions/Precautions  Restrictions/Precautions: Fall Risk (High fall risk)  Position Activity Restriction  Other position/activity restrictions: Pt. comes in today by [facility] from home with a fall. Pt. was found in front of his recliner. EMS reports that there was a line of stool in front of his chair from where the patient had been crawling. Pt. has history of dementia. Subjective   General  Chart Reviewed: Yes  Response To Previous Treatment: Patient with no complaints from previous session.   Family / Caregiver Present: No  Subjective  Subjective: Pt denies pain and agreeable to working with PT.

## 2019-02-04 NOTE — PROGRESS NOTES
Modifier : CK    Goals  Short term goals  Time Frame for Short term goals: Discharge  Short term goal 1: Min A for functional transfers to ADL surfaces - Goal met 2/4. Tasha  Short term goal 2: Set-up UB bathing/dressing - DId not meet goal 2/4, ModA  Short term goal 3: Min A LB bathing/dressing - Did not meet goal 2/4. MaxA  Short term goal 4: Min A for bed mobility - Goal met 2/4. Standby. Short term goal 5: updated goal1: Pt will perform functional transfer with CGA. Long term goals  Long term goal 1: updated goal2: Pt. will perform functional mobility for ADLs. Therapy Time   Individual Concurrent Group Co-treatment   Time In 0829         Time Out 0858         Minutes 29           Timed Code Treatment Minutes:  29 minutes  Total Treatment Minutes:  29 minutes    TRISHA Garrett, MARKR/L [id number] (I have reviewed and agree with the above documentation.

## 2019-02-22 ENCOUNTER — APPOINTMENT (OUTPATIENT)
Dept: GENERAL RADIOLOGY | Age: 84
DRG: 871 | End: 2019-02-22
Payer: MEDICARE

## 2019-02-22 ENCOUNTER — HOSPITAL ENCOUNTER (INPATIENT)
Age: 84
LOS: 1 days | Discharge: HOSPICE | DRG: 871 | End: 2019-02-22
Attending: EMERGENCY MEDICINE
Payer: MEDICARE

## 2019-02-22 VITALS
HEART RATE: 102 BPM | TEMPERATURE: 97.7 F | WEIGHT: 132.3 LBS | SYSTOLIC BLOOD PRESSURE: 121 MMHG | BODY MASS INDEX: 19.6 KG/M2 | RESPIRATION RATE: 28 BRPM | DIASTOLIC BLOOD PRESSURE: 58 MMHG | OXYGEN SATURATION: 90 % | HEIGHT: 69 IN

## 2019-02-22 DIAGNOSIS — A41.9 SEPTICEMIA (HCC): Primary | ICD-10-CM

## 2019-02-22 PROBLEM — R74.8 ELEVATED ALKALINE PHOSPHATASE LEVEL: Status: ACTIVE | Noted: 2019-02-22

## 2019-02-22 PROBLEM — E87.20 METABOLIC ACIDOSIS: Status: ACTIVE | Noted: 2019-02-22

## 2019-02-22 PROBLEM — E87.29 HIGH ANION GAP METABOLIC ACIDOSIS: Status: ACTIVE | Noted: 2019-02-22

## 2019-02-22 PROBLEM — R65.20 SEVERE SEPSIS (HCC): Status: ACTIVE | Noted: 2019-02-22

## 2019-02-22 PROBLEM — R77.8 ELEVATED TROPONIN: Status: ACTIVE | Noted: 2019-02-22

## 2019-02-22 PROBLEM — E87.20 LACTIC ACIDOSIS: Status: ACTIVE | Noted: 2019-02-22

## 2019-02-22 PROBLEM — R65.21 SEPTIC SHOCK (HCC): Status: ACTIVE | Noted: 2019-02-22

## 2019-02-22 PROBLEM — N30.01 ACUTE CYSTITIS WITH HEMATURIA: Status: ACTIVE | Noted: 2019-02-22

## 2019-02-22 PROBLEM — R00.0 TACHYCARDIA: Status: ACTIVE | Noted: 2019-02-22

## 2019-02-22 PROBLEM — E80.6 HYPERBILIRUBINEMIA: Status: ACTIVE | Noted: 2019-02-22

## 2019-02-22 PROBLEM — D72.829 LEUKOCYTOSIS: Status: ACTIVE | Noted: 2019-02-22

## 2019-02-22 PROBLEM — R79.89 PRERENAL AZOTEMIA: Status: ACTIVE | Noted: 2019-02-22

## 2019-02-22 PROBLEM — N17.9 ACUTE RENAL FAILURE (ARF) (HCC): Status: ACTIVE | Noted: 2019-02-22

## 2019-02-22 PROBLEM — R74.01 TRANSAMINITIS: Status: ACTIVE | Noted: 2019-02-22

## 2019-02-22 PROBLEM — R06.82 TACHYPNEA: Status: ACTIVE | Noted: 2019-02-22

## 2019-02-22 PROBLEM — E43 SEVERE PROTEIN-CALORIE MALNUTRITION (HCC): Status: ACTIVE | Noted: 2019-02-22

## 2019-02-22 PROBLEM — E87.5 HYPERKALEMIA: Status: ACTIVE | Noted: 2019-02-22

## 2019-02-22 LAB
A/G RATIO: 0.6 (ref 1.1–2.2)
ALBUMIN SERPL-MCNC: 2.4 G/DL (ref 3.4–5)
ALP BLD-CCNC: 343 U/L (ref 40–129)
ALT SERPL-CCNC: 63 U/L (ref 10–40)
AMMONIA: 53 UMOL/L (ref 16–60)
ANION GAP SERPL CALCULATED.3IONS-SCNC: 27 MMOL/L (ref 3–16)
AST SERPL-CCNC: 137 U/L (ref 15–37)
BACTERIA: ABNORMAL /HPF
BILIRUB SERPL-MCNC: 2.5 MG/DL (ref 0–1)
BILIRUBIN URINE: ABNORMAL
BLOOD, URINE: ABNORMAL
BUN BLDV-MCNC: 216 MG/DL (ref 7–20)
CALCIUM SERPL-MCNC: 8.5 MG/DL (ref 8.3–10.6)
CHLORIDE BLD-SCNC: 97 MMOL/L (ref 99–110)
CLARITY: ABNORMAL
CO2: 14 MMOL/L (ref 21–32)
COLOR: ABNORMAL
CREAT SERPL-MCNC: 6.6 MG/DL (ref 0.8–1.3)
EPITHELIAL CELLS, UA: ABNORMAL /HPF
GFR AFRICAN AMERICAN: 10
GFR NON-AFRICAN AMERICAN: 8
GLOBULIN: 4.2 G/DL
GLUCOSE BLD-MCNC: 299 MG/DL (ref 70–99)
GLUCOSE URINE: 100 MG/DL
HCT VFR BLD CALC: 48.4 % (ref 40.5–52.5)
HEMOGLOBIN: 15.7 G/DL (ref 13.5–17.5)
KETONES, URINE: ABNORMAL MG/DL
LACTIC ACID: 3.6 MMOL/L (ref 0.4–2)
LEUKOCYTE ESTERASE, URINE: ABNORMAL
MCH RBC QN AUTO: 30 PG (ref 26–34)
MCHC RBC AUTO-ENTMCNC: 32.5 G/DL (ref 31–36)
MCV RBC AUTO: 92.5 FL (ref 80–100)
MICROSCOPIC EXAMINATION: YES
NITRITE, URINE: POSITIVE
PDW BLD-RTO: 15.6 % (ref 12.4–15.4)
PH UA: 5.5
PLATELET # BLD: 186 K/UL (ref 135–450)
PMV BLD AUTO: 10.5 FL (ref 5–10.5)
POTASSIUM SERPL-SCNC: 6 MMOL/L (ref 3.5–5.1)
PRO-BNP: ABNORMAL PG/ML (ref 0–449)
PROTEIN UA: 100 MG/DL
RBC # BLD: 5.23 M/UL (ref 4.2–5.9)
RBC UA: ABNORMAL /HPF (ref 0–2)
SODIUM BLD-SCNC: 138 MMOL/L (ref 136–145)
SPECIFIC GRAVITY UA: >=1.03
TOTAL PROTEIN: 6.6 G/DL (ref 6.4–8.2)
TROPONIN: 0.17 NG/ML
URINE REFLEX TO CULTURE: YES
URINE TYPE: ABNORMAL
UROBILINOGEN, URINE: 1 E.U./DL
WBC # BLD: 35 K/UL (ref 4–11)
WBC UA: >100 /HPF (ref 0–5)

## 2019-02-22 PROCEDURE — 84484 ASSAY OF TROPONIN QUANT: CPT

## 2019-02-22 PROCEDURE — 6360000002 HC RX W HCPCS: Performed by: EMERGENCY MEDICINE

## 2019-02-22 PROCEDURE — 4500000026 HC ED CRITICAL CARE PROCEDURE

## 2019-02-22 PROCEDURE — 51702 INSERT TEMP BLADDER CATH: CPT

## 2019-02-22 PROCEDURE — 99291 CRITICAL CARE FIRST HOUR: CPT

## 2019-02-22 PROCEDURE — 87186 SC STD MICRODIL/AGAR DIL: CPT

## 2019-02-22 PROCEDURE — 71045 X-RAY EXAM CHEST 1 VIEW: CPT

## 2019-02-22 PROCEDURE — 6360000002 HC RX W HCPCS: Performed by: PHYSICIAN ASSISTANT

## 2019-02-22 PROCEDURE — 96367 TX/PROPH/DG ADDL SEQ IV INF: CPT

## 2019-02-22 PROCEDURE — 87801 DETECT AGNT MULT DNA AMPLI: CPT

## 2019-02-22 PROCEDURE — 6360000002 HC RX W HCPCS: Performed by: INTERNAL MEDICINE

## 2019-02-22 PROCEDURE — 93005 ELECTROCARDIOGRAM TRACING: CPT | Performed by: EMERGENCY MEDICINE

## 2019-02-22 PROCEDURE — 2500000003 HC RX 250 WO HCPCS: Performed by: PHYSICIAN ASSISTANT

## 2019-02-22 PROCEDURE — 87040 BLOOD CULTURE FOR BACTERIA: CPT

## 2019-02-22 PROCEDURE — 96361 HYDRATE IV INFUSION ADD-ON: CPT

## 2019-02-22 PROCEDURE — 85027 COMPLETE CBC AUTOMATED: CPT

## 2019-02-22 PROCEDURE — 80053 COMPREHEN METABOLIC PANEL: CPT

## 2019-02-22 PROCEDURE — 1200000000 HC SEMI PRIVATE

## 2019-02-22 PROCEDURE — 6370000000 HC RX 637 (ALT 250 FOR IP): Performed by: PHYSICIAN ASSISTANT

## 2019-02-22 PROCEDURE — 96376 TX/PRO/DX INJ SAME DRUG ADON: CPT

## 2019-02-22 PROCEDURE — 02HV33Z INSERTION OF INFUSION DEVICE INTO SUPERIOR VENA CAVA, PERCUTANEOUS APPROACH: ICD-10-PCS | Performed by: EMERGENCY MEDICINE

## 2019-02-22 PROCEDURE — 87086 URINE CULTURE/COLONY COUNT: CPT

## 2019-02-22 PROCEDURE — 87077 CULTURE AEROBIC IDENTIFY: CPT

## 2019-02-22 PROCEDURE — 81001 URINALYSIS AUTO W/SCOPE: CPT

## 2019-02-22 PROCEDURE — 83880 ASSAY OF NATRIURETIC PEPTIDE: CPT

## 2019-02-22 PROCEDURE — 96366 THER/PROPH/DIAG IV INF ADDON: CPT

## 2019-02-22 PROCEDURE — 2500000003 HC RX 250 WO HCPCS: Performed by: EMERGENCY MEDICINE

## 2019-02-22 PROCEDURE — 2580000003 HC RX 258: Performed by: EMERGENCY MEDICINE

## 2019-02-22 PROCEDURE — 83605 ASSAY OF LACTIC ACID: CPT

## 2019-02-22 PROCEDURE — 2580000003 HC RX 258: Performed by: PHYSICIAN ASSISTANT

## 2019-02-22 PROCEDURE — 96375 TX/PRO/DX INJ NEW DRUG ADDON: CPT

## 2019-02-22 PROCEDURE — 82140 ASSAY OF AMMONIA: CPT

## 2019-02-22 PROCEDURE — 96365 THER/PROPH/DIAG IV INF INIT: CPT

## 2019-02-22 RX ORDER — NICOTINE POLACRILEX 4 MG
15 LOZENGE BUCCAL PRN
Status: CANCELLED | OUTPATIENT
Start: 2019-02-22

## 2019-02-22 RX ORDER — DEXTROSE MONOHYDRATE 50 MG/ML
100 INJECTION, SOLUTION INTRAVENOUS PRN
Status: CANCELLED | OUTPATIENT
Start: 2019-02-22

## 2019-02-22 RX ORDER — ONDANSETRON 2 MG/ML
4 INJECTION INTRAMUSCULAR; INTRAVENOUS EVERY 4 HOURS PRN
Status: CANCELLED | OUTPATIENT
Start: 2019-02-22

## 2019-02-22 RX ORDER — ASPIRIN 81 MG/1
81 TABLET ORAL NIGHTLY
Status: CANCELLED | OUTPATIENT
Start: 2019-02-22

## 2019-02-22 RX ORDER — SIMVASTATIN 10 MG
20 TABLET ORAL NIGHTLY
Status: CANCELLED | OUTPATIENT
Start: 2019-02-22

## 2019-02-22 RX ORDER — PANTOPRAZOLE SODIUM 40 MG/1
40 TABLET, DELAYED RELEASE ORAL
Status: CANCELLED | OUTPATIENT
Start: 2019-02-23

## 2019-02-22 RX ORDER — LORAZEPAM 2 MG/ML
0.5 INJECTION INTRAMUSCULAR
Status: DISCONTINUED | OUTPATIENT
Start: 2019-02-22 | End: 2019-02-23 | Stop reason: HOSPADM

## 2019-02-22 RX ORDER — SODIUM CHLORIDE 0.9 % (FLUSH) 0.9 %
10 SYRINGE (ML) INJECTION EVERY 12 HOURS SCHEDULED
Status: CANCELLED | OUTPATIENT
Start: 2019-02-22

## 2019-02-22 RX ORDER — MORPHINE SULFATE 2 MG/ML
4 INJECTION, SOLUTION INTRAMUSCULAR; INTRAVENOUS
Status: DISCONTINUED | OUTPATIENT
Start: 2019-02-22 | End: 2019-02-23 | Stop reason: HOSPADM

## 2019-02-22 RX ORDER — SODIUM CHLORIDE 0.9 % (FLUSH) 0.9 %
10 SYRINGE (ML) INJECTION PRN
Status: CANCELLED | OUTPATIENT
Start: 2019-02-22

## 2019-02-22 RX ORDER — LORAZEPAM 2 MG/ML
1 INJECTION INTRAMUSCULAR ONCE
Status: COMPLETED | OUTPATIENT
Start: 2019-02-22 | End: 2019-02-22

## 2019-02-22 RX ORDER — DEXTROSE MONOHYDRATE 25 G/50ML
12.5 INJECTION, SOLUTION INTRAVENOUS PRN
Status: CANCELLED | OUTPATIENT
Start: 2019-02-22

## 2019-02-22 RX ORDER — 0.9 % SODIUM CHLORIDE 0.9 %
1000 INTRAVENOUS SOLUTION INTRAVENOUS ONCE
Status: COMPLETED | OUTPATIENT
Start: 2019-02-22 | End: 2019-02-22

## 2019-02-22 RX ORDER — 0.9 % SODIUM CHLORIDE 0.9 %
30 INTRAVENOUS SOLUTION INTRAVENOUS ONCE
Status: DISCONTINUED | OUTPATIENT
Start: 2019-02-22 | End: 2019-02-22 | Stop reason: SDUPTHER

## 2019-02-22 RX ORDER — MORPHINE SULFATE 2 MG/ML
2 INJECTION, SOLUTION INTRAMUSCULAR; INTRAVENOUS
Status: DISCONTINUED | OUTPATIENT
Start: 2019-02-22 | End: 2019-02-23 | Stop reason: HOSPADM

## 2019-02-22 RX ORDER — CALCIUM GLUCONATE 94 MG/ML
1 INJECTION, SOLUTION INTRAVENOUS ONCE
Status: COMPLETED | OUTPATIENT
Start: 2019-02-22 | End: 2019-02-22

## 2019-02-22 RX ORDER — LORAZEPAM 2 MG/ML
1 INJECTION INTRAMUSCULAR
Status: DISCONTINUED | OUTPATIENT
Start: 2019-02-22 | End: 2019-02-23 | Stop reason: HOSPADM

## 2019-02-22 RX ORDER — 0.9 % SODIUM CHLORIDE 0.9 %
30 INTRAVENOUS SOLUTION INTRAVENOUS ONCE
Status: COMPLETED | OUTPATIENT
Start: 2019-02-22 | End: 2019-02-22

## 2019-02-22 RX ORDER — DEXTROSE MONOHYDRATE 25 G/50ML
25 INJECTION, SOLUTION INTRAVENOUS ONCE
Status: COMPLETED | OUTPATIENT
Start: 2019-02-22 | End: 2019-02-22

## 2019-02-22 RX ADMIN — SODIUM CHLORIDE 1800 ML: 9 INJECTION, SOLUTION INTRAVENOUS at 16:00

## 2019-02-22 RX ADMIN — SODIUM CHLORIDE: 9 INJECTION, SOLUTION INTRAVENOUS at 16:30

## 2019-02-22 RX ADMIN — CALCIUM GLUCONATE 1 G: 94 INJECTION, SOLUTION INTRAVENOUS at 16:00

## 2019-02-22 RX ADMIN — SODIUM CHLORIDE 1000 ML: 9 INJECTION, SOLUTION INTRAVENOUS at 14:25

## 2019-02-22 RX ADMIN — LORAZEPAM 1 MG: 2 INJECTION INTRAMUSCULAR; INTRAVENOUS at 18:05

## 2019-02-22 RX ADMIN — PIPERACILLIN SODIUM AND TAZOBACTAM SODIUM 3.38 G: 3; .375 INJECTION, POWDER, FOR SOLUTION INTRAVENOUS at 16:30

## 2019-02-22 RX ADMIN — DEXTROSE MONOHYDRATE 25 G: 25 INJECTION, SOLUTION INTRAVENOUS at 16:25

## 2019-02-22 RX ADMIN — MORPHINE SULFATE 2 MG: 2 INJECTION, SOLUTION INTRAMUSCULAR; INTRAVENOUS at 19:03

## 2019-02-22 RX ADMIN — Medication: at 18:08

## 2019-02-22 RX ADMIN — INSULIN HUMAN 10 UNITS: 100 INJECTION, SOLUTION PARENTERAL at 16:15

## 2019-02-22 RX ADMIN — Medication 25 MEQ: at 16:00

## 2019-02-22 RX ADMIN — Medication: at 17:27

## 2019-02-23 LAB
EKG ATRIAL RATE: 83 BPM
EKG DIAGNOSIS: NORMAL
EKG P AXIS: 75 DEGREES
EKG P-R INTERVAL: 160 MS
EKG Q-T INTERVAL: 420 MS
EKG QRS DURATION: 116 MS
EKG QTC CALCULATION (BAZETT): 493 MS
EKG R AXIS: 117 DEGREES
EKG T AXIS: 49 DEGREES
EKG VENTRICULAR RATE: 83 BPM
REPORT: NORMAL

## 2019-02-23 PROCEDURE — 93010 ELECTROCARDIOGRAM REPORT: CPT | Performed by: INTERNAL MEDICINE

## 2019-02-24 LAB
ORGANISM: ABNORMAL
URINE CULTURE, ROUTINE: ABNORMAL
URINE CULTURE, ROUTINE: ABNORMAL

## 2019-02-25 LAB
BLOOD CULTURE, ROUTINE: ABNORMAL
BLOOD CULTURE, ROUTINE: ABNORMAL
CULTURE, BLOOD 2: ABNORMAL
ORGANISM: ABNORMAL

## 2019-03-24 PROBLEM — R77.8 ELEVATED TROPONIN: Status: RESOLVED | Noted: 2019-02-22 | Resolved: 2019-03-24
